# Patient Record
Sex: MALE | Race: WHITE | NOT HISPANIC OR LATINO | Employment: FULL TIME | URBAN - METROPOLITAN AREA
[De-identification: names, ages, dates, MRNs, and addresses within clinical notes are randomized per-mention and may not be internally consistent; named-entity substitution may affect disease eponyms.]

---

## 2017-02-13 ENCOUNTER — HOSPITAL ENCOUNTER (OUTPATIENT)
Dept: GENERAL RADIOLOGY | Facility: HOSPITAL | Age: 47
Discharge: HOME | End: 2017-02-13
Attending: INTERNAL MEDICINE | Admitting: INTERNAL MEDICINE

## 2017-02-13 ENCOUNTER — TRANSCRIBE ORDERS (OUTPATIENT)
Dept: ADMINISTRATIVE | Facility: HOSPITAL | Age: 47
End: 2017-02-13

## 2017-02-13 DIAGNOSIS — M06.4 UNSPECIFIED INFLAMMATORY POLYARTHROPATHY: ICD-10-CM

## 2017-02-13 DIAGNOSIS — Z79.899 POLYPHARMACY: ICD-10-CM

## 2017-02-13 DIAGNOSIS — M06.4 UNSPECIFIED INFLAMMATORY POLYARTHROPATHY: Primary | ICD-10-CM

## 2017-02-13 PROCEDURE — 71020 HC CHEST PA AND LATERAL: CPT

## 2018-07-23 ENCOUNTER — AMBULATORY SURGICAL CENTER (AMBULATORY)
Dept: URBAN - METROPOLITAN AREA SURGERY 17 | Facility: SURGERY | Age: 48
End: 2018-07-23
Payer: COMMERCIAL

## 2018-07-23 ENCOUNTER — OFFICE (AMBULATORY)
Dept: URBAN - METROPOLITAN AREA PATHOLOGY 4 | Facility: PATHOLOGY | Age: 48
End: 2018-07-23
Payer: COMMERCIAL

## 2018-07-23 VITALS
OXYGEN SATURATION: 95 % | DIASTOLIC BLOOD PRESSURE: 66 MMHG | DIASTOLIC BLOOD PRESSURE: 67 MMHG | HEART RATE: 79 BPM | OXYGEN SATURATION: 93 % | SYSTOLIC BLOOD PRESSURE: 93 MMHG | HEART RATE: 85 BPM | HEART RATE: 80 BPM | OXYGEN SATURATION: 97 % | HEART RATE: 83 BPM | DIASTOLIC BLOOD PRESSURE: 42 MMHG | TEMPERATURE: 98.3 F | WEIGHT: 195 LBS | OXYGEN SATURATION: 96 % | SYSTOLIC BLOOD PRESSURE: 103 MMHG | RESPIRATION RATE: 19 BRPM | OXYGEN SATURATION: 99 % | SYSTOLIC BLOOD PRESSURE: 120 MMHG | OXYGEN SATURATION: 92 % | DIASTOLIC BLOOD PRESSURE: 58 MMHG | SYSTOLIC BLOOD PRESSURE: 104 MMHG | DIASTOLIC BLOOD PRESSURE: 57 MMHG | SYSTOLIC BLOOD PRESSURE: 115 MMHG | RESPIRATION RATE: 12 BRPM | SYSTOLIC BLOOD PRESSURE: 96 MMHG | RESPIRATION RATE: 13 BRPM | DIASTOLIC BLOOD PRESSURE: 69 MMHG | TEMPERATURE: 97.5 F | HEART RATE: 75 BPM | HEIGHT: 73 IN | OXYGEN SATURATION: 94 % | HEART RATE: 74 BPM | DIASTOLIC BLOOD PRESSURE: 53 MMHG | RESPIRATION RATE: 16 BRPM | DIASTOLIC BLOOD PRESSURE: 59 MMHG | SYSTOLIC BLOOD PRESSURE: 88 MMHG | HEART RATE: 81 BPM

## 2018-07-23 DIAGNOSIS — Z12.11 ENCOUNTER FOR SCREENING FOR MALIGNANT NEOPLASM OF COLON: ICD-10-CM

## 2018-07-23 DIAGNOSIS — K64.0 FIRST DEGREE HEMORRHOIDS: ICD-10-CM

## 2018-07-23 DIAGNOSIS — K63.5 POLYP OF COLON: ICD-10-CM

## 2018-07-23 DIAGNOSIS — D12.5 BENIGN NEOPLASM OF SIGMOID COLON: ICD-10-CM

## 2018-07-23 PROBLEM — Z80.0 FAMILY HISTORY OF COLON CANCER: Status: ACTIVE | Noted: 2018-07-23

## 2018-07-23 LAB
GI HISTOLOGY: A. UNSPECIFIED: (no result)
GI HISTOLOGY: PDF REPORT: (no result)

## 2018-07-23 PROCEDURE — 45385 COLONOSCOPY W/LESION REMOVAL: CPT | Mod: 33 | Performed by: INTERNAL MEDICINE

## 2018-07-23 PROCEDURE — 88305 TISSUE EXAM BY PATHOLOGIST: CPT | Mod: 33 | Performed by: INTERNAL MEDICINE

## 2018-07-23 RX ADMIN — PROPOFOL 50 MG: 10 INJECTION, EMULSION INTRAVENOUS at 13:40

## 2018-07-23 RX ADMIN — PROPOFOL 100 MG: 10 INJECTION, EMULSION INTRAVENOUS at 13:35

## 2018-07-23 RX ADMIN — PROPOFOL 25 MG: 10 INJECTION, EMULSION INTRAVENOUS at 13:37

## 2018-07-23 RX ADMIN — PROPOFOL 50 MG: 10 INJECTION, EMULSION INTRAVENOUS at 13:48

## 2018-07-23 RX ADMIN — PROPOFOL 50 MG: 10 INJECTION, EMULSION INTRAVENOUS at 13:37

## 2018-07-23 RX ADMIN — PROPOFOL 50 MG: 10 INJECTION, EMULSION INTRAVENOUS at 13:44

## 2018-07-23 RX ADMIN — LIDOCAINE HYDROCHLORIDE 25 MG: 10 INJECTION, SOLUTION EPIDURAL; INFILTRATION; INTRACAUDAL; PERINEURAL at 13:35

## 2018-07-23 NOTE — SERVICEHPINOTES
KELI SORIA  TREVON  48  male  1970   presents for a bypass screening colonoscopy at [facility].

## 2019-05-15 ENCOUNTER — OFFICE VISIT (OUTPATIENT)
Dept: FAMILY MEDICINE CLINIC | Facility: CLINIC | Age: 49
End: 2019-05-15

## 2019-05-15 VITALS
BODY MASS INDEX: 26.54 KG/M2 | OXYGEN SATURATION: 98 % | SYSTOLIC BLOOD PRESSURE: 116 MMHG | HEIGHT: 73 IN | HEART RATE: 78 BPM | TEMPERATURE: 98.6 F | DIASTOLIC BLOOD PRESSURE: 76 MMHG | WEIGHT: 200.3 LBS

## 2019-05-15 DIAGNOSIS — D84.821 IMMUNOSUPPRESSION DUE TO DRUG THERAPY (HCC): ICD-10-CM

## 2019-05-15 DIAGNOSIS — Z80.42 FAMILY HISTORY OF PROSTATE CANCER: ICD-10-CM

## 2019-05-15 DIAGNOSIS — Z00.00 ROUTINE GENERAL MEDICAL EXAMINATION AT A HEALTH CARE FACILITY: Primary | ICD-10-CM

## 2019-05-15 DIAGNOSIS — Z12.5 SCREENING FOR PROSTATE CANCER: ICD-10-CM

## 2019-05-15 DIAGNOSIS — Z79.899 IMMUNOSUPPRESSION DUE TO DRUG THERAPY (HCC): ICD-10-CM

## 2019-05-15 DIAGNOSIS — R53.83 OTHER FATIGUE: ICD-10-CM

## 2019-05-15 DIAGNOSIS — R40.0 DAYTIME SLEEPINESS: ICD-10-CM

## 2019-05-15 DIAGNOSIS — G47.9 DIFFICULTY SLEEPING: ICD-10-CM

## 2019-05-15 PROCEDURE — 99396 PREV VISIT EST AGE 40-64: CPT | Performed by: NURSE PRACTITIONER

## 2019-05-15 RX ORDER — MEDROXYPROGESTERONE ACETATE 150 MG/ML
INJECTION, SUSPENSION INTRAMUSCULAR
COMMUNITY
Start: 2019-02-13 | End: 2022-09-15

## 2019-05-15 NOTE — PROGRESS NOTES
Subjective   Randall Morin is a 48 y.o. male.     Chief Complaint   Patient presents with   • Annual Exam     New to [facility] patient is new to me.  He is here for physical, has not been to see primary care in a couple years.  He struggles with some health problems mainly due to rheumatoid arthritis.  He is , works for [employer], has 2 children who are high school and college aged.    Rheumatoid: He is followed by Dr. Haider, rheumatology.  His RA is mostly in his hands and wrists.  He just recently had a flare and has just come off of steroids for this, is on Enbrel for control.  He reports he is in pain most of the time recently which makes it even more difficult to sleep.    Depression: He is followed by psychiatry and is stable on his current medications.  He did talk to his psychiatrist about his sleep problems.  He wakes up many times throughout the night and never feels like he had a restful sleep.  This is exacerbated with his RA pain.  Psychiatry recommended a sleep study.    Daytime sleepiness: He has excessive sleepiness during the day, he can fall asleep talking to somebody, and is sleepy all day long.  He never feels rested.  He does snore, he is not sure if he has any apneic episodes but does tend to wake up sometimes every hour.    Family history prostate cancer: He comes from a large family with 11 brothers and sisters.  He has 4 brothers with prostate cancer that developed under the age of 50.  2 of his brothers are  from the prostate cancer.  He has had PSAs in the past, but the most recent one is 2 years ago.  He denies any hematuria or difficulty urinating but does notice that sometimes he does not feel like he empties his bladder all the way and has urinary frequency.    He has FH breast cancer, no FH colon cancer.  He had colonoscopy appr 6 months ago which was normal.    He denies tobacco, rarely drinks etoh, denies recreational drug use.     He is unsure of vaccinations and so  "declines them today.  He wants to check with his rheumatologist first.     Social History     Tobacco Use   • Smoking status: Never Smoker   • Smokeless tobacco: Never Used   Substance Use Topics   • Alcohol use: Yes     Comment: rare   • Drug use: No       The following portions of the patient's history were reviewed and updated as appropriate: allergies, current medications, past family history, past medical history, past social history, past surgical history and problem list.    Review of Systems   Constitutional: Positive for activity change (not as active as he normally is due to fatigue) and fatigue. Negative for chills and fever.   HENT: Positive for congestion, ear discharge (intermittently gets crusty ears with drainage-usually after RA flare) and sore throat (appr 3-4 days). Negative for ear pain, postnasal drip, rhinorrhea and trouble swallowing.    Eyes: Positive for itching (due to dry eyes) and visual disturbance (wears corrective lenses). Negative for pain and discharge.   Respiratory: Negative for cough, shortness of breath and wheezing.    Cardiovascular: Negative for chest pain and palpitations.   Gastrointestinal: Negative for abdominal pain, blood in stool, constipation, diarrhea, nausea and vomiting.   Genitourinary: Positive for frequency. Negative for difficulty urinating, dysuria, hematuria, scrotal swelling, testicular pain and urgency.   Musculoskeletal: Positive for arthralgias, joint swelling and myalgias. Negative for gait problem.   Skin: Negative for rash.   Allergic/Immunologic: Negative for environmental allergies.   Neurological: Positive for headaches (HA yesterday, resolved today). Negative for dizziness and weakness.   Hematological: Positive for adenopathy (anterior cervical).   Psychiatric/Behavioral: Positive for decreased concentration (memory loss due to sleepiness).       Objective   Blood pressure 116/76, pulse 78, temperature 98.6 °F (37 °C), height 185.4 cm (73\"), weight " 90.9 kg (200 lb 4.8 oz), SpO2 98 %.    Physical Exam   Constitutional: He is oriented to person, place, and time. He appears well-developed and well-nourished. No distress.   HENT:   Head: Normocephalic and atraumatic.   Right Ear: Tympanic membrane, external ear and ear canal normal.   Left Ear: Tympanic membrane, external ear and ear canal normal.   Mouth/Throat: Uvula is midline and oropharynx is clear and moist.   Eyes: EOM are normal. Pupils are equal, round, and reactive to light. Right eye exhibits no discharge. Left eye exhibits no discharge. Right conjunctiva is injected. Left conjunctiva is injected.   Neck: Neck supple. No thyromegaly present.   Cardiovascular: Normal rate, regular rhythm, normal heart sounds and intact distal pulses.   No murmur heard.  Pulmonary/Chest: Effort normal and breath sounds normal.   Abdominal: Soft. Bowel sounds are normal. There is no hepatosplenomegaly. There is no tenderness.   Musculoskeletal: He exhibits deformity.   Gait smooth and steady  B/l hands with enlarged finger joints-no erythema   Lymphadenopathy:     He has cervical adenopathy (left mildly TTP).   Neurological: He is alert and oriented to person, place, and time. No cranial nerve deficit.   Skin: Skin is warm and dry.   Psychiatric: He has a normal mood and affect.   Nursing note and vitals reviewed.      Assessment   Problem List Items Addressed This Visit     None      Visit Diagnoses     Routine general medical examination at a health care facility    -  Primary    Relevant Orders    Comprehensive metabolic panel    Lipid Panel With LDL/HDL Ratio    CBC and Differential    TSH Rfx On Abnormal To Free T4    Family history of prostate cancer        Relevant Orders    PSA Screen    Ambulatory Referral to Urology    Daytime sleepiness        Immunosuppression due to drug therapy        Screening for prostate cancer        Relevant Orders    PSA Screen    Ambulatory Referral to Urology    Other fatigue         Relevant Orders    CBC and Differential    TSH Rfx On Abnormal To Free T4    Ambulatory Referral to Sleep Medicine    Difficulty sleeping        Relevant Orders    TSH Rfx On Abnormal To Free T4    Ambulatory Referral to Sleep Medicine           Procedures           Impression and Plan: We will get some basic blood work since it was last done in 2016.  I will get a screening PSA, but I think it is wise to refer him to urology for follow up due to the strong family history of prostate cancer.  I will refer to sleep medicine for possible sleep apnea evaluation.  His rheumatoid arthritis had been well controlled on Enbrel, however he may need to have an evaluation for a different medication due to recent flare.  He will continue to follow with rheumatology.  Depression: Seems to be pretty well controlled on current medications, continue these and continue follow-up with psychiatry.    He is up-to-date on colonoscopy, however I do not have access to that report and will request it.  He declines any vaccines today.    Most likely has seasonal allergies vs viral URI.  He has supportive meds at home and declines Rx.  We did discuss importance of making sure he that if he worsens he let me know due to immunosuppression.     We discussed healthy diet and ways to incorporate activity and exercise into daily life.  Recommendations include trying to get at least 150 mins. of moderate intensity aerobic activity that provide enjoyment to lower risks of CVD disease.     Health Maintenance Due   Topic Date Due   • TDAP/TD VACCINES (1 - Tdap) 07/07/1987   • COLONOSCOPY  02/29/2016   • ZOSTER VACCINE (1 of 2) 07/07/2018

## 2019-05-16 LAB
ALBUMIN SERPL-MCNC: 4 G/DL (ref 3.5–5.2)
ALBUMIN/GLOB SERPL: 1.2 G/DL
ALP SERPL-CCNC: 49 U/L (ref 39–117)
ALT SERPL-CCNC: 28 U/L (ref 1–41)
AST SERPL-CCNC: 36 U/L (ref 1–40)
BASOPHILS # BLD AUTO: 0.05 10*3/MM3 (ref 0–0.2)
BASOPHILS NFR BLD AUTO: 0.6 % (ref 0–1.5)
BILIRUB SERPL-MCNC: 0.3 MG/DL (ref 0.2–1.2)
BUN SERPL-MCNC: 16 MG/DL (ref 6–20)
BUN/CREAT SERPL: 15.7 (ref 7–25)
CALCIUM SERPL-MCNC: 10.1 MG/DL (ref 8.6–10.5)
CHLORIDE SERPL-SCNC: 102 MMOL/L (ref 98–107)
CHOLEST SERPL-MCNC: 164 MG/DL (ref 0–200)
CO2 SERPL-SCNC: 29.8 MMOL/L (ref 22–29)
CREAT SERPL-MCNC: 1.02 MG/DL (ref 0.76–1.27)
EOSINOPHIL # BLD AUTO: 0.14 10*3/MM3 (ref 0–0.4)
EOSINOPHIL NFR BLD AUTO: 1.8 % (ref 0.3–6.2)
ERYTHROCYTE [DISTWIDTH] IN BLOOD BY AUTOMATED COUNT: 13.6 % (ref 12.3–15.4)
GLOBULIN SER CALC-MCNC: 3.4 GM/DL
GLUCOSE SERPL-MCNC: 74 MG/DL (ref 65–99)
HCT VFR BLD AUTO: 45 % (ref 37.5–51)
HDLC SERPL-MCNC: 38 MG/DL (ref 40–60)
HGB BLD-MCNC: 14.3 G/DL (ref 13–17.7)
IMM GRANULOCYTES # BLD AUTO: 0.03 10*3/MM3 (ref 0–0.05)
IMM GRANULOCYTES NFR BLD AUTO: 0.4 % (ref 0–0.5)
LDLC SERPL CALC-MCNC: 82 MG/DL (ref 0–100)
LDLC/HDLC SERPL: 2.16 {RATIO}
LYMPHOCYTES # BLD AUTO: 2.83 10*3/MM3 (ref 0.7–3.1)
LYMPHOCYTES NFR BLD AUTO: 35.9 % (ref 19.6–45.3)
MCH RBC QN AUTO: 30.5 PG (ref 26.6–33)
MCHC RBC AUTO-ENTMCNC: 31.8 G/DL (ref 31.5–35.7)
MCV RBC AUTO: 95.9 FL (ref 79–97)
MONOCYTES # BLD AUTO: 0.99 10*3/MM3 (ref 0.1–0.9)
MONOCYTES NFR BLD AUTO: 12.5 % (ref 5–12)
NEUTROPHILS # BLD AUTO: 3.85 10*3/MM3 (ref 1.7–7)
NEUTROPHILS NFR BLD AUTO: 48.8 % (ref 42.7–76)
NRBC BLD AUTO-RTO: 0 /100 WBC (ref 0–0.2)
PLATELET # BLD AUTO: 251 10*3/MM3 (ref 140–450)
POTASSIUM SERPL-SCNC: 4.9 MMOL/L (ref 3.5–5.2)
PROT SERPL-MCNC: 7.4 G/DL (ref 6–8.5)
PSA SERPL-MCNC: 0.71 NG/ML (ref 0–4)
RBC # BLD AUTO: 4.69 10*6/MM3 (ref 4.14–5.8)
SODIUM SERPL-SCNC: 142 MMOL/L (ref 136–145)
TRIGL SERPL-MCNC: 219 MG/DL (ref 0–150)
TSH SERPL DL<=0.005 MIU/L-ACNC: 1.38 MIU/ML (ref 0.27–4.2)
VLDLC SERPL CALC-MCNC: 43.8 MG/DL
WBC # BLD AUTO: 7.89 10*3/MM3 (ref 3.4–10.8)

## 2019-05-16 NOTE — PROGRESS NOTES
Spoke in detail with Patient about results, patient expressed understanding and will follow up as agreed.     Any pending Labs and/or Diagnostic procedures required have been ordered for future release.    Heidy HERNANDEZ

## 2019-05-30 ENCOUNTER — APPOINTMENT (OUTPATIENT)
Dept: SLEEP MEDICINE | Facility: HOSPITAL | Age: 49
End: 2019-05-30

## 2022-09-15 ENCOUNTER — OFFICE VISIT (OUTPATIENT)
Dept: FAMILY MEDICINE CLINIC | Facility: CLINIC | Age: 52
End: 2022-09-15

## 2022-09-15 VITALS
HEIGHT: 73 IN | HEART RATE: 87 BPM | SYSTOLIC BLOOD PRESSURE: 97 MMHG | OXYGEN SATURATION: 96 % | WEIGHT: 192.8 LBS | BODY MASS INDEX: 25.55 KG/M2 | DIASTOLIC BLOOD PRESSURE: 62 MMHG | TEMPERATURE: 96.8 F

## 2022-09-15 DIAGNOSIS — Z00.00 ROUTINE GENERAL MEDICAL EXAMINATION AT A HEALTH CARE FACILITY: Primary | ICD-10-CM

## 2022-09-15 PROCEDURE — 99386 PREV VISIT NEW AGE 40-64: CPT | Performed by: NURSE PRACTITIONER

## 2022-09-15 NOTE — PROGRESS NOTES
"Chief Complaint  Establish Care (Re est, CPE) and Annual Exam    Subjective        Randall Morin presents to [facility]  History of Present Illness    The patient presents today for a physical.    The patient reports that it has been 3 years since he has seen a doctor and his wife told him he should go.    The patient states that he has no complaints. He states that he and his family have avoided getting COVID-19. He states he works from home most of the time.    The patient reports that he saw a podiatrist for plantar fasciitis. He states he had steroid injections twice and it is now gone.     The patient states that he is being followed by Dr. Beard, a urologist, and his \"numbers have been super flat.\" He states he last saw Dr. Beard about 3 months ago.    The patient reports that his rheumatoid arthritis has improved. He adds that the specialist was not certain it was RA, but decided to treat it as if it were to see if his symptoms improved. He states that he received 1 injection, but did not return for another due to COVID-19. The patient states \"I eliot feel better than I ever have.\"     The patient mentions that he has bumps under his skin and wonders what they are.    The patient states that his health goals are to stay in shape and continue to work out.    The patient reports that he is doing well on Celexa 20 mg daily. He expresses going through life changes such as his children finishing high school and college. He states that his insomnia is intermittent; he does not take medication for sleep.    The patient states that his diet is good. He reports that his colonoscopy was clear and he was advised to have another in 10 years.    The patient denies shortness of breath, cough, or chest pain. He admits that his heart \"flutters\" occasionally. He recalls noticing it more with anxiety. The patient denies melena, heartburn, or ear problems. He states that he always has cerumen " "present. The patient denies trouble swallowing. He reports that he sees his dentist every 6 months.    The patient asks if he needs to get a shingles vaccine after having shingles in 2017. The patient states his last tetanus vaccination was about 15 years ago. He declines getting one today.    Review of Systems has been reviewed and is negative other than what has been discussed within the HPI.    Objective   Vital Signs:  BP 97/62   Pulse 87   Temp 96.8 °F (36 °C)   Ht 185.4 cm (73\")   Wt 87.5 kg (192 lb 12.8 oz)   SpO2 96%   BMI 25.44 kg/m²   Estimated body mass index is 25.44 kg/m² as calculated from the following:    Height as of this encounter: 185.4 cm (73\").    Weight as of this encounter: 87.5 kg (192 lb 12.8 oz).    BMI is >= 25 and <30. (Overweight) The following options were offered after discussion;: weight loss educational material (shared in after visit summary)      Physical Exam  Vitals and nursing note reviewed.   Constitutional:       General: He is not in acute distress.     Appearance: He is well-developed. He is not ill-appearing.      Comments: Fit appearing, appears younger than stated age   HENT:      Head: Normocephalic and atraumatic.      Right Ear: Tympanic membrane, ear canal and external ear normal.      Left Ear: Tympanic membrane, ear canal and external ear normal.      Ears:      Comments: Scant dried cerumen, L>R, no impaction     Mouth/Throat:      Mouth: Mucous membranes are moist.      Pharynx: Uvula midline. No posterior oropharyngeal erythema.   Eyes:      General: No scleral icterus.        Right eye: No discharge.         Left eye: No discharge.      Conjunctiva/sclera: Conjunctivae normal.      Pupils: Pupils are equal, round, and reactive to light.   Neck:      Thyroid: No thyromegaly.   Cardiovascular:      Rate and Rhythm: Normal rate and regular rhythm.      Heart sounds: Normal heart sounds.   Pulmonary:      Effort: Pulmonary effort is normal.      Breath sounds: " Normal breath sounds.   Abdominal:      General: Bowel sounds are normal.      Palpations: Abdomen is soft.      Tenderness: There is no abdominal tenderness.   Musculoskeletal:         General: No deformity.      Cervical back: Neck supple.      Comments: Gait smooth and steady   Lymphadenopathy:      Cervical: No cervical adenopathy.   Skin:     General: Skin is warm and dry.      Comments: Right arm with soft, mobile cystic mass, no erythema, NTTP   Neurological:      General: No focal deficit present.      Mental Status: He is alert and oriented to person, place, and time.   Psychiatric:         Attention and Perception: Attention and perception normal.         Mood and Affect: Mood and affect normal.         Speech: Speech normal.         Behavior: Behavior normal. Behavior is cooperative.         Thought Content: Thought content normal.      Comments: Very pleasant, conversant        Result Review :             No results were reviewed or obtained today.     Assessment and Plan   Diagnoses and all orders for this visit:    1. Routine general medical examination at a health care facility (Primary)  -     CBC & Differential  -     Comprehensive Metabolic Panel  -     Lipid Panel With LDL / HDL Ratio  -     TSH Rfx On Abnormal To Free T4    Health maintenance    Appropriate health maintenance and prevention topics specific for this patient were discussed today.  Additionally, health goals, and health concerns addressed as appropriate.  Pt was encouraged to stay up to date on recommended screenings and vaccines based on USPSTF guidelines.     The patient was offered a Tdap today, but he declined. He was advised to get one should he have an injury to his skin.     The bumps noted under his skin were freely mobile and fluctuant. They appear to be small fatty tumors. Discussed s/s that require f/u eval.    He will let us know where his colonoscopy was performed so that we can get a copy into our system.   He was  advised that he can still get herpes zoster even though he had it before.    Anxiety  The patient was offered a higher dose of citalopram. He states that he will consider doing so.           Follow Up   Return in about 1 year (around 9/15/2023) for Annual physical.  Patient was given instructions and counseling regarding his condition or for health maintenance advice. Please see specific information pulled into the AVS if appropriate.     Transcribed from ambient dictation for ANTWON Díaz by Sharmin Solomon.  09/15/22   13:25 EDT    Patient verbalized consent to the visit recording.  I have personally performed the services described in this document as transcribed by the above individual, and it is both accurate and complete.  ANTWON Díaz  9/20/2022  08:10 EDT

## 2022-09-16 LAB
ALBUMIN SERPL-MCNC: 4.2 G/DL (ref 3.5–5.2)
ALBUMIN/GLOB SERPL: 1.6 G/DL
ALP SERPL-CCNC: 59 U/L (ref 39–117)
ALT SERPL-CCNC: 15 U/L (ref 1–41)
AST SERPL-CCNC: 21 U/L (ref 1–40)
BASOPHILS # BLD AUTO: 0.04 10*3/MM3 (ref 0–0.2)
BASOPHILS NFR BLD AUTO: 0.6 % (ref 0–1.5)
BILIRUB SERPL-MCNC: 0.4 MG/DL (ref 0–1.2)
BUN SERPL-MCNC: 12 MG/DL (ref 6–20)
BUN/CREAT SERPL: 10.7 (ref 7–25)
CALCIUM SERPL-MCNC: 9.9 MG/DL (ref 8.6–10.5)
CHLORIDE SERPL-SCNC: 105 MMOL/L (ref 98–107)
CHOLEST SERPL-MCNC: 170 MG/DL (ref 0–200)
CO2 SERPL-SCNC: 28.9 MMOL/L (ref 22–29)
CREAT SERPL-MCNC: 1.12 MG/DL (ref 0.76–1.27)
EGFRCR-CYS SERPLBLD CKD-EPI 2021: 78.1 ML/MIN/1.73
EOSINOPHIL # BLD AUTO: 0.07 10*3/MM3 (ref 0–0.4)
EOSINOPHIL NFR BLD AUTO: 1.1 % (ref 0.3–6.2)
ERYTHROCYTE [DISTWIDTH] IN BLOOD BY AUTOMATED COUNT: 12.8 % (ref 12.3–15.4)
GLOBULIN SER CALC-MCNC: 2.6 GM/DL
GLUCOSE SERPL-MCNC: 79 MG/DL (ref 65–99)
HCT VFR BLD AUTO: 42.1 % (ref 37.5–51)
HDLC SERPL-MCNC: 42 MG/DL (ref 40–60)
HGB BLD-MCNC: 13.9 G/DL (ref 13–17.7)
IMM GRANULOCYTES # BLD AUTO: 0.02 10*3/MM3 (ref 0–0.05)
IMM GRANULOCYTES NFR BLD AUTO: 0.3 % (ref 0–0.5)
LDLC SERPL CALC-MCNC: 109 MG/DL (ref 0–100)
LDLC/HDLC SERPL: 2.55 {RATIO}
LYMPHOCYTES # BLD AUTO: 2.47 10*3/MM3 (ref 0.7–3.1)
LYMPHOCYTES NFR BLD AUTO: 38.4 % (ref 19.6–45.3)
MCH RBC QN AUTO: 30.7 PG (ref 26.6–33)
MCHC RBC AUTO-ENTMCNC: 33 G/DL (ref 31.5–35.7)
MCV RBC AUTO: 92.9 FL (ref 79–97)
MONOCYTES # BLD AUTO: 0.79 10*3/MM3 (ref 0.1–0.9)
MONOCYTES NFR BLD AUTO: 12.3 % (ref 5–12)
NEUTROPHILS # BLD AUTO: 3.04 10*3/MM3 (ref 1.7–7)
NEUTROPHILS NFR BLD AUTO: 47.3 % (ref 42.7–76)
NRBC BLD AUTO-RTO: 0.2 /100 WBC (ref 0–0.2)
PLATELET # BLD AUTO: 254 10*3/MM3 (ref 140–450)
POTASSIUM SERPL-SCNC: 4.6 MMOL/L (ref 3.5–5.2)
PROT SERPL-MCNC: 6.8 G/DL (ref 6–8.5)
RBC # BLD AUTO: 4.53 10*6/MM3 (ref 4.14–5.8)
SODIUM SERPL-SCNC: 140 MMOL/L (ref 136–145)
TRIGL SERPL-MCNC: 104 MG/DL (ref 0–150)
TSH SERPL DL<=0.005 MIU/L-ACNC: 1.31 UIU/ML (ref 0.27–4.2)
VLDLC SERPL CALC-MCNC: 19 MG/DL (ref 5–40)
WBC # BLD AUTO: 6.43 10*3/MM3 (ref 3.4–10.8)

## 2023-03-07 ENCOUNTER — OFFICE VISIT (OUTPATIENT)
Dept: FAMILY MEDICINE CLINIC | Facility: CLINIC | Age: 53
End: 2023-03-07
Payer: COMMERCIAL

## 2023-03-07 VITALS
SYSTOLIC BLOOD PRESSURE: 113 MMHG | TEMPERATURE: 97.1 F | DIASTOLIC BLOOD PRESSURE: 69 MMHG | BODY MASS INDEX: 25.45 KG/M2 | HEIGHT: 73 IN | WEIGHT: 192 LBS | HEART RATE: 82 BPM | OXYGEN SATURATION: 96 %

## 2023-03-07 DIAGNOSIS — B02.9 HERPES ZOSTER WITHOUT COMPLICATION: Primary | ICD-10-CM

## 2023-03-07 PROCEDURE — 99213 OFFICE O/P EST LOW 20 MIN: CPT | Performed by: NURSE PRACTITIONER

## 2023-03-07 RX ORDER — VALACYCLOVIR HYDROCHLORIDE 1 G/1
1000 TABLET, FILM COATED ORAL 3 TIMES DAILY
Qty: 21 TABLET | Refills: 0 | Status: SHIPPED | OUTPATIENT
Start: 2023-03-07 | End: 2023-03-14

## 2023-03-07 NOTE — PROGRESS NOTES
"Chief Complaint  Rash (C/o rash R side back/abdomin )    Subjective        Randall Morin presents to [facility]  History of Present Illness    Mr. Randall Morin is a 52-year-old male who presents today for evaluation of a rash.    The rash began right flank on 03/02/2023 and is now spreading across flank to abdomen.  He feels miserable. He sleeps on his side and notes that the rash itches and burns. The new lesions have been present for the last 2 days. He has had shingles in the past; however, he did not experience any pain associated with shingles. No fever, chills.  He is upset he did not get shingles booster as we discussed last visit.     He has never had COVID-19. He has not had an upper respiratory infection in approximately 5 years.     The following portions of the patient's history were reviewed and updated as appropriate: allergies, current medications, past family history, past medical history, past social history, past surgical history and problem list.      Objective   Vital Signs:  /69   Pulse 82   Temp 97.1 °F (36.2 °C)   Ht 185.4 cm (73\")   Wt 87.1 kg (192 lb)   SpO2 96%   BMI 25.33 kg/m²   Estimated body mass index is 25.33 kg/m² as calculated from the following:    Height as of this encounter: 185.4 cm (73\").    Weight as of this encounter: 87.1 kg (192 lb).       BMI is >= 25 and <30. (Overweight) The following options were offered after discussion;: weight loss educational material (shared in after visit summary)      Physical Exam  Vitals and nursing note reviewed.   Constitutional:       General: He is not in acute distress.     Appearance: He is well-developed. He is not diaphoretic.   HENT:      Head: Normocephalic and atraumatic.   Eyes:      General:         Right eye: No discharge.         Left eye: No discharge.      Conjunctiva/sclera: Conjunctivae normal.   Pulmonary:      Effort: Pulmonary effort is normal.   Musculoskeletal:         General: No " deformity.      Comments: Gait smooth and steady   Skin:     General: Skin is warm and dry.      Comments: Vesicular rash on red base linear pattern right flank and extending to right lower abdomen   Neurological:      Mental Status: He is alert and oriented to person, place, and time.   Psychiatric:         Mood and Affect: Mood normal.         Behavior: Behavior normal.        Result Review :                   Assessment and Plan   Diagnoses and all orders for this visit:    1. Herpes zoster without complication (Primary)  -     valACYclovir (Valtrex) 1000 MG tablet; Take 1 tablet by mouth 3 (Three) Times a Day for 7 days.  Dispense: 21 tablet; Refill: 0    Herpes zoster  The patient will start Valtrex 3 times daily for 7 days. He may also use over-the-counter lidocaine patches but not on open skin. . If his symptoms do not improve, he will contact the office.       Follow Up   No follow-ups on file.  Patient was given instructions and counseling regarding his condition or for health maintenance advice. Please see specific information pulled into the AVS if appropriate.       Answers for HPI/ROS submitted by the patient on 3/6/2023  What is the primary reason for your visit?: Rash    Transcribed from ambient dictation for ANTWON Díaz by Loni Landry.  03/07/23   17:20 EST    Patient or patient representative verbalized consent to the visit recording.  I have personally performed the services described in this document as transcribed by the above individual, and it is both accurate and complete.

## 2023-03-13 ENCOUNTER — OFFICE VISIT (OUTPATIENT)
Dept: SPORTS MEDICINE | Facility: CLINIC | Age: 53
End: 2023-03-13
Payer: COMMERCIAL

## 2023-03-13 VITALS
TEMPERATURE: 98.4 F | BODY MASS INDEX: 25.26 KG/M2 | DIASTOLIC BLOOD PRESSURE: 74 MMHG | HEART RATE: 85 BPM | WEIGHT: 190.6 LBS | HEIGHT: 73 IN | SYSTOLIC BLOOD PRESSURE: 126 MMHG | OXYGEN SATURATION: 98 %

## 2023-03-13 DIAGNOSIS — M54.16 PAIN, RADICULAR, LUMBAR: Primary | ICD-10-CM

## 2023-03-13 PROCEDURE — 99213 OFFICE O/P EST LOW 20 MIN: CPT | Performed by: FAMILY MEDICINE

## 2023-03-13 RX ORDER — PREDNISONE 20 MG/1
TABLET ORAL
Qty: 12 TABLET | Refills: 0 | Status: SHIPPED | OUTPATIENT
Start: 2023-03-13

## 2023-03-13 RX ORDER — TIZANIDINE 4 MG/1
TABLET ORAL
Qty: 30 TABLET | Refills: 0 | Status: SHIPPED | OUTPATIENT
Start: 2023-03-13

## 2023-03-13 NOTE — PROGRESS NOTES
"Chief Complaint  Back Pain (LOW BACK- severe pain started about a month ago. Shoots down legs, numbness )    Subjective        Randall Morin presents to [facility]  History of Present Illness  Patient began to note some lumbar spine discomfort about a month ago after working out, over the past 2 weeks he has seen increase in pain.  Pain is worse with transitioning from seated to standing.  Pain does seem to wax and wane, some mornings I can better get out of bed and other mornings and fine\".  Patient also has been having intermittent episodes of radiation of pain and numbness down posterior legs, \"it can be the right or the left but never at the same time\".  Alarm symptoms.  No previous back injuries.         Objective   Vital Signs:  /74 (BP Location: Left arm, Patient Position: Sitting, Cuff Size: Adult)   Pulse 85   Temp 98.4 °F (36.9 °C) (Temporal)   Ht 185.4 cm (72.99\")   Wt 86.5 kg (190 lb 9.6 oz)   SpO2 98%   BMI 25.15 kg/m²   Estimated body mass index is 25.15 kg/m² as calculated from the following:    Height as of this encounter: 185.4 cm (72.99\").    Weight as of this encounter: 86.5 kg (190 lb 9.6 oz).             Physical Exam  Vitals reviewed.   Constitutional:       Appearance: He is well-developed.   HENT:      Head: Normocephalic and atraumatic.   Eyes:      Conjunctiva/sclera: Conjunctivae normal.      Pupils: Pupils are equal, round, and reactive to light.   Cardiovascular:      Comments: No peripheral edema  Pulmonary:      Effort: Pulmonary effort is normal.   Musculoskeletal:      Comments: Patient does have tightness and tenderness of the paravertebral bundles in the lumbar spine from L2-L4.  Pain however is worse with left lateral bending and not as bad with right lateral bending.  Patient has some pain with extension but does not cause radiation of pain down the legs.  Patient begins to get pain past about 30 degrees of forward flexion.  Patient has " bilateral straight leg raises although left is greater than right and a positive slump test bilaterally.  DTRs are 1+ symmetric motor 5 out of 5.   Skin:     General: Skin is warm and dry.   Neurological:      Mental Status: He is alert and oriented to person, place, and time.   Psychiatric:         Behavior: Behavior normal.        Result Review :        Lumbar Spine X-Ray  Indication: Pain  Views: AP and Lateral    Findings:  There is lumbar levoscoliosis with apex around L2, there is a corresponding dextroscoliosis in the lower thoracic spine.  There are degenerative endplate changes from L1-L5.  Narrowed disc spaces from L3-L5.  There also appears to be a slight retrolisthesis of L5 on S1.    No prior studies were available for comparison.             Assessment and Plan   Diagnoses and all orders for this visit:    1. Pain, radicular, lumbar (Primary)  -     XR Spine Lumbar 2 or 3 View  -     Cancel: Ambulatory Referral to Physical Therapy  -     predniSONE (DELTASONE) 20 MG tablet; 3 qd x 2 d, then 2 qd x 2 d, then 1 qd x 2 days  Dispense: 12 tablet; Refill: 0  -     tiZANidine (ZANAFLEX) 4 MG tablet; 1/2 to 1 q 8 h prn spasm  Dispense: 30 tablet; Refill: 0  -     Ambulatory Referral to Physical Therapy      If no significant improvement with PT then will need to check MRI lumbar spine.       Follow Up   No follow-ups on file.  Patient was given instructions and counseling regarding his condition or for health maintenance advice. Please see specific information pulled into the AVS if appropriate.

## 2023-03-15 ENCOUNTER — PATIENT ROUNDING (BHMG ONLY) (OUTPATIENT)
Dept: SPORTS MEDICINE | Facility: CLINIC | Age: 53
End: 2023-03-15
Payer: COMMERCIAL

## 2023-03-15 NOTE — PROGRESS NOTES
March 15, 2023    A Brille[employer] Message has been sent to the patient for PATIENT ROUNDING with [facility]

## 2023-03-28 ENCOUNTER — TREATMENT (OUTPATIENT)
Dept: PHYSICAL THERAPY | Facility: CLINIC | Age: 53
End: 2023-03-28
Payer: COMMERCIAL

## 2023-03-28 DIAGNOSIS — M53.86 DECREASED ROM OF LUMBAR SPINE: ICD-10-CM

## 2023-03-28 DIAGNOSIS — R29.898 IMPAIRED FLEXIBILITY OF LOWER EXTREMITY: ICD-10-CM

## 2023-03-28 DIAGNOSIS — M54.42 ACUTE BILATERAL LOW BACK PAIN WITH BILATERAL SCIATICA: Primary | ICD-10-CM

## 2023-03-28 DIAGNOSIS — M54.41 ACUTE BILATERAL LOW BACK PAIN WITH BILATERAL SCIATICA: Primary | ICD-10-CM

## 2023-03-28 PROCEDURE — 97162 PT EVAL MOD COMPLEX 30 MIN: CPT | Performed by: PHYSICAL THERAPIST

## 2023-03-28 PROCEDURE — 97012 MECHANICAL TRACTION THERAPY: CPT | Performed by: PHYSICAL THERAPIST

## 2023-03-28 PROCEDURE — 97110 THERAPEUTIC EXERCISES: CPT | Performed by: PHYSICAL THERAPIST

## 2023-03-28 NOTE — PROGRESS NOTES
"  Physical Therapy Initial Evaluation and Plan of Care    [address] [facility] [address]    Patient: Randall Morin   : 1970  Diagnosis/ICD-10 Code:  Acute bilateral low back pain with bilateral sciatica [M54.42, M54.41]  Referring practitioner: Alphonso Villa,*  Date of Initial Visit: 3/28/2023  Today's Date: 3/28/2023  Patient seen for 1 sessions           Subjective Questionnaire: Back Index: 20/50 or 40%      Subjective Evaluation    History of Present Illness  Mechanism of injury: Patient began to note some lumbar spine discomfort about a month ago after working out, over the past month (beginning of March) he has seen increase in pain.  Pain is worse with transitioning from seated to standing.  Pain does seem to wax and wane, some mornings I can better get out of bed and other mornings are fine.  Patient also has been having intermittent episodes of radiation of pain and numbness down posterior legs, \"it can be the right or the left but never at the same time\".  No previous back injuries.  Pt was given a steroid pack ~ 2 weeks ago; but did not take it. Still working out; [employer] at work and daily workouts. I can't sit for long. I can't lift anything overhead.     Hobbies: bowling, golf, fly fishing    X ray  Lumbar dextroscoliosis is noted. Mild retrolisthesis of L2 on L3, L3 on  L4, L5 on S1. Vertebral body heights appear preserved. No acute fracture is identified. Multilevel endplate spurring, disc space narrowing, facet arthropathy are present. If there is further clinical concern, MRI could be considered for further evaluation.    PMH: broken neck per pt report >20 years ago      Patient Occupation: [employer] work Quality of life: excellent    Pain  Current pain ratin  At best pain ratin  At worst pain ratin  Quality: discomfort, sharp, tight and dull ache  Relieving factors: change in position  Aggravating factors: lifting, movement, overhead activity, standing, " repetitive movement, squatting, prolonged positioning, sleeping and ambulation  Progression: improved    Patient Goals  Patient goals for therapy: decreased pain, improved balance, increased motion, increased strength, independence with ADLs/IADLs, return to sport/leisure activities and return to work             Objective          Postural Observations  Correction of posture: makes symptoms worse    Additional Postural Observation Details  Sitting and standing makes it worse; really any prolonged positioning    Pt reports sitting up straight increases back pain    Palpation   Left   Hypertonic in the erector spinae, lumbar paraspinals and quadratus lumborum.   Tenderness of the erector spinae, lumbar paraspinals and quadratus lumborum.     Right   Hypertonic in the erector spinae, lumbar paraspinals and quadratus lumborum. Tenderness of the erector spinae, lumbar paraspinals and quadratus lumborum.     Tenderness     Lumbar Spine  Tenderness in the spinous process and facet joint.     Left Hip   No tenderness in the ASIS and PSIS.     Right Hip   No tenderness in the ASIS and PSIS.     Additional Tenderness Details  Moderate tenderness at L3-L5 spinous process and facet joints     Neurological Testing     Sensation     Lumbar   Left   Intact: light touch    Right   Intact: light touch    Reflexes   Left   Patellar (L4): trace (1+)  Achilles (S1): absent (0)    Right   Patellar (L4): normal (2+)  Achilles (S1): absent (0)    Active Range of Motion     Lumbar   Flexion: Active lumbar flexion: limited due to inflexibility, but no back pain.   Extension: WFL  Left lateral flexion: Active left lumbar lateral flexion: limited ~25% of normal.   Right lateral flexion: WFL  Left rotation: WFL  Right rotation: WFL    Strength/Myotome Testing     Lumbar   Left   Heel walk: normal  Toe walk: normal    Right   Heel walk: normal  Toe walk: normal    Left Hip   Planes of Motion   Flexion: 4  Extension: 4-  Abduction:  4+  Adduction: 4+    Right Hip   Planes of Motion   Flexion: 4  Extension: 4-  Abduction: 4+  Adduction: 4+    Left Knee   Flexion: 5  Extension: 5    Right Knee   Flexion: 5  Extension: 5    Left Ankle/Foot   Plantar flexion: 5    Right Ankle/Foot   Plantar flexion: 5    Tests       Thoracic   Negative slump.     Lumbar   Positive repeated flexion.     Left   Negative passive SLR.     Right   Positive passive SLR.     Left Pelvic Girdle/Sacrum   Negative: sacrum compression.     Right Pelvic Girdle/Sacrum   Negative: sacrum compression.     Additional Tests Details  ASIS and PSIS WNL    Lumbar Flexibility Comments:   HS stretch/SLR on RLE: 37 degrees  HS stretch/SLR on LLE: 42 degrees          Assessment & Plan     Assessment  Impairments: abnormal or restricted ROM, activity intolerance, impaired balance, impaired physical strength, lacks appropriate home exercise program and pain with function  Functional Limitations: carrying objects, lifting, sleeping, walking, pulling, pushing, uncomfortable because of pain, moving in bed, sitting, standing, stooping and unable to perform repetitive tasks  Assessment details: Randall Morin is a 52 y.o. year-old male referred to physical therapy for B low back pain with sciatic like symptoms; pt reports radiating pain into each LE. He presents with a evolving clinical presentation.  He has no comorbidities and personal factors of an active lifestyle (golfing, fishing, bowling) that may affect his progress in the plan of care.  Signs and symptoms are consistent with physical therapy diagnosis of neural tension BLE and limited lumbar ROM with noted hip/core weakness. Pt with impaired LE flexibility. Tenderness to palpation at L3-L5. Patient is appropriate for skilled physical therapy in order to reduce pain and increase ease with daily mobility. During evaluation, pt educated on anatomy, goal of interventions, initial evaluation, workout modifications/activities to avoid and body  mechanics to promote healthy lifestyle and improve quality of life.     Prognosis: good    Goals  Plan Goals: ST weeks  1. Pt will be able to perform/tolerate initial HEP without increased back pain  2. Increased lumbar AROM to WNL and pain free in all planes to allow for increased ease with sit-stand transfers and functional activities  3. Pt will improve Back Index score to 15/50 to improve subjective function during ADLs  4.Decrease lumbar PS tightness and tenderness to minimal with palpation to be able to sleep comfortably through the night     LT weeks  1. Pt will be able to perform/tolerate HEP independently  2. Pt will be able to sit for up to 30 min to be able to make it through his meetings at work  3. Pt will improve Back Index to 10/50 to improve subjective function during ADLs  4. Pt will improve (B) LE hip flexion and lower abdominal strength to 5/5 to allow for pushing, pulling and activities to occur without pain (driving, sitting, household & job requirements)  5. Pt will improve HS flexibility with SLR to at least 60 degrees to be able to decrease neural tension down BLE  6. Pt will be able to lift weights and complete recreational ax without exacerbation back pain    Plan  Therapy options: will be seen for skilled therapy services  Planned modality interventions: cryotherapy, electrical stimulation/Russian stimulation, TENS, traction, ultrasound, thermotherapy (hydrocollator packs) and dry needling  Planned therapy interventions: abdominal trunk stabilization, balance/weight-bearing training, body mechanics training, flexibility, functional ROM exercises, gait training, home exercise program, joint mobilization, manual therapy, postural training, spinal/joint mobilization, strengthening, stretching, therapeutic activities, motor coordination training, soft tissue mobilization, ADL retraining, neuromuscular re-education, transfer training and IADL retraining  Treatment plan discussed with:  patient  Plan details: 2x per week for 12 weeks        Visit Diagnoses:    ICD-10-CM ICD-9-CM   1. Acute bilateral low back pain with bilateral sciatica  M54.42 724.2    M54.41 724.3   2. Decreased ROM of lumbar spine  M53.86 724.9   3. Impaired flexibility of lower extremity  R29.898 781.99       Timed:  Manual Therapy:    -     mins  70599;  Therapeutic Exercise:    15     mins  35180;     Neuromuscular Myron:    -    mins  34146;    Therapeutic Activity:     -     mins  36588;     Gait Training:      -     mins  26285;     Ultrasound:     -     mins  03060;    Electrical Stimulation:    -     mins  01448 ( );    Low Eval                       -      Mins  90028  Mod Eval                        30     Mins  95504  High Eval                       -     Mins  03106    Untimed:  Electrical Stimulation:    -     mins  90035 ( );  Mechanical Traction:    15     mins  30392;     Timed Treatment:   15   mins   Total Treatment:     70   mins    PT SIGNATURE: WALI Arroyo license: [id number]  DATE TREATMENT INITIATED: 3/28/2023    Initial Certification  Certification Period: 6/26/2023  I certify that the therapy services are furnished while this patient is under my care.  The services outlined above are required by this patient, and will be reviewed every 90 days.     PHYSICIAN: Alphonso Villa MD      DATE:     Please sign and return via fax to [phone]. Thank you, [facility].

## 2023-04-06 ENCOUNTER — TREATMENT (OUTPATIENT)
Dept: PHYSICAL THERAPY | Facility: CLINIC | Age: 53
End: 2023-04-06
Payer: COMMERCIAL

## 2023-04-06 DIAGNOSIS — R29.898 IMPAIRED FLEXIBILITY OF LOWER EXTREMITY: ICD-10-CM

## 2023-04-06 DIAGNOSIS — M54.41 ACUTE BILATERAL LOW BACK PAIN WITH BILATERAL SCIATICA: Primary | ICD-10-CM

## 2023-04-06 DIAGNOSIS — M53.86 DECREASED ROM OF LUMBAR SPINE: ICD-10-CM

## 2023-04-06 DIAGNOSIS — M54.42 ACUTE BILATERAL LOW BACK PAIN WITH BILATERAL SCIATICA: Primary | ICD-10-CM

## 2023-04-06 PROCEDURE — 97012 MECHANICAL TRACTION THERAPY: CPT | Performed by: PHYSICAL THERAPIST

## 2023-04-06 PROCEDURE — 97110 THERAPEUTIC EXERCISES: CPT | Performed by: PHYSICAL THERAPIST

## 2023-04-06 NOTE — PROGRESS NOTES
Physical Therapy Daily Treatment Note      Patient: Randall Morin   : 1970  Referring practitioner: Alphonso Villa,*  Date of Initial Visit: Type: THERAPY  Noted: 3/28/2023  Today's Date: 2023  Patient seen for 2 sessions         Randall Morin reports: it is better the last few days; I think the traction helped, stretches are good increase. No back pain; but mild numbness/tingling between each leg. No sleep disturbance.       Objective     Active Range of Motion      Lumbar   Flexion: Active lumbar flexion: limited ~50% with back pain  Extension: WFL  Left lateral flexion: WFL  Right lateral flexion: WFL  Left rotation: WFL  Right rotation: WFL    Lumbar Flexibility Comments:   HS stretch/SLR on RLE: 62 degrees  HS stretch/SLR on LLE: 66 degrees     See Exercise, Manual, and Modality Logs for complete treatment.       Assessment/Plan  Improving LE flexibility with increased HS length and decreased back pain. Added piriformis stretch and sciatic nerve glides to help with numbness/tingling down BLE and continued mechanical lumbar traction. Pt going out of town and calling once he gets back to schedule.        Progress per Plan of Care and Progress strengthening /stabilization /functional activity           Timed:  Manual Therapy:    -     mins  85754;  Therapeutic Exercise:    25     mins  05303;     Neuromuscular Myron:    -    mins  48225;    Therapeutic Activity:     -     mins  10022;     Gait Training:      -     mins  92573;     Ultrasound:     -     mins  73486;      Untimed:  Electrical Stimulation:    -     mins  28980 ( );  Mechanical Traction:    15     mins  08436;   Dry needling:      -     mins  37661/    Timed Treatment:   25   mins   Total Treatment:     40   mins  Keri Manzo PT  Physical Therapist    License #: [id number]

## 2023-05-30 ENCOUNTER — TELEPHONE (OUTPATIENT)
Dept: FAMILY MEDICINE CLINIC | Facility: CLINIC | Age: 53
End: 2023-05-30

## 2023-05-30 NOTE — TELEPHONE ENCOUNTER
Caller: Randall Morin    Relationship: Self    Best call back number: [phone]    What specialty or service is being requested: SLEEP STUDY     What is the provider, practice or medical service name: [facility]     What is the office location: [facility] LOCATION     PLEASE CALL AND ADVISE

## 2023-06-01 ENCOUNTER — OFFICE VISIT (OUTPATIENT)
Dept: FAMILY MEDICINE CLINIC | Facility: CLINIC | Age: 53
End: 2023-06-01

## 2023-06-01 VITALS
HEART RATE: 81 BPM | BODY MASS INDEX: 25.45 KG/M2 | SYSTOLIC BLOOD PRESSURE: 122 MMHG | TEMPERATURE: 96.8 F | DIASTOLIC BLOOD PRESSURE: 68 MMHG | WEIGHT: 192 LBS | HEIGHT: 73 IN

## 2023-06-01 DIAGNOSIS — R40.0 DAYTIME SLEEPINESS: ICD-10-CM

## 2023-06-01 DIAGNOSIS — R06.83 SNORING: Primary | ICD-10-CM

## 2023-06-01 DIAGNOSIS — R06.81 WITNESSED EPISODE OF APNEA: ICD-10-CM

## 2023-06-01 PROCEDURE — 99213 OFFICE O/P EST LOW 20 MIN: CPT | Performed by: NURSE PRACTITIONER

## 2023-06-01 NOTE — PROGRESS NOTES
"Chief Complaint  Sleep Apnea (Pt requesting referral for sleep study )    Subjective        Randall Morin presents to [facility]  History of Present Illness patient is here requesting referral for sleep study.  He had 1 prior to COVID that was over 2 nights and was inconclusive.  Patient reports he is not a good sleeper in general and the first night he got very little sleep and second night only about 6 which is his norm.  He did not follow up after that due to COVID.    His spouse reports loud snoring nightly with witnessed apneas and he does have some daytime sleepiness.  He is the largest of his brothers who all also have sleep apnea although he certainly is not obese.      Objective   Vital Signs:  /68   Pulse 81   Temp 96.8 °F (36 °C)   Ht 185.4 cm (73\")   Wt 87.1 kg (192 lb)   BMI 25.33 kg/m²   Estimated body mass index is 25.33 kg/m² as calculated from the following:    Height as of this encounter: 185.4 cm (73\").    Weight as of this encounter: 87.1 kg (192 lb).       BMI is >= 25 and <30. (Overweight) The following options were offered after discussion;: weight loss educational material (shared in after visit summary)      Physical Exam  Vitals and nursing note reviewed.   Constitutional:       General: He is not in acute distress.     Appearance: He is well-developed. He is not ill-appearing or diaphoretic.   HENT:      Head: Normocephalic and atraumatic.      Mouth/Throat:      Comments: Does appear to have more narrow oropharynx  Eyes:      General:         Right eye: No discharge.         Left eye: No discharge.      Conjunctiva/sclera: Conjunctivae normal.   Cardiovascular:      Rate and Rhythm: Normal rate and regular rhythm.      Heart sounds: Normal heart sounds.   Pulmonary:      Effort: Pulmonary effort is normal.      Breath sounds: Normal breath sounds.   Abdominal:      General: Bowel sounds are normal.      Palpations: Abdomen is soft.      Tenderness: " There is no abdominal tenderness.   Musculoskeletal:         General: No deformity.      Cervical back: Neck supple.      Comments: Gait smooth and steady   Lymphadenopathy:      Cervical: No cervical adenopathy.   Skin:     General: Skin is warm and dry.   Neurological:      Mental Status: He is alert and oriented to person, place, and time.   Psychiatric:         Mood and Affect: Mood normal.         Behavior: Behavior normal.        Result Review :                   Assessment and Plan   Diagnoses and all orders for this visit:    1. Snoring (Primary)  -     Ambulatory Referral to Sleep Medicine    2. Daytime sleepiness    3. Witnessed episode of apnea    referred to sleep medicine for eval.  If he does not hear back that scheduled within 2 weeks will let me know.  He is aware of health concerns related to sleep apnea.          Follow Up   No follow-ups on file.  Patient was given instructions and counseling regarding his condition or for health maintenance advice. Please see specific information pulled into the AVS if appropriate.       Answers for HPI/ROS submitted by the patient on 5/31/2023  What is the primary reason for your visit?: Other  Please describe your symptoms.: unable to sleep through night, excessive snoring  Have you had these symptoms before?: Yes  How long have you been having these symptoms?: Greater than 2 weeks

## 2023-08-09 ENCOUNTER — OFFICE VISIT (OUTPATIENT)
Dept: SLEEP MEDICINE | Facility: HOSPITAL | Age: 53
End: 2023-08-09
Payer: COMMERCIAL

## 2023-08-09 VITALS — HEART RATE: 74 BPM | BODY MASS INDEX: 24.78 KG/M2 | WEIGHT: 187 LBS | OXYGEN SATURATION: 97 % | HEIGHT: 73 IN

## 2023-08-09 DIAGNOSIS — G47.9 RESTLESS SLEEPER: ICD-10-CM

## 2023-08-09 DIAGNOSIS — R06.81 WITNESSED EPISODE OF APNEA: Primary | ICD-10-CM

## 2023-08-09 DIAGNOSIS — G47.8 NON-RESTORATIVE SLEEP: ICD-10-CM

## 2023-08-09 PROCEDURE — G0463 HOSPITAL OUTPT CLINIC VISIT: HCPCS

## 2023-08-09 NOTE — PROGRESS NOTES
[facility]  [address]  [place] [address]  Phone   Fax       Randall Morin  [id number]   1970  53 y.o.  male      Referring Provider and PCP: Dayanna Bowers APRN    Type of service: Initial Sleep Medicine Consult.  Date of service: 8/9/2023          CHIEF COMPLAINT: Suspected sleep apnea      HISTORY OF PRESENT ILLNESS:  Thank you for asking us to see Randall Morin.  Randall Morin 53 y.o. was seen today on 8/9/2023 at [facility].  Patient presents today with symptoms of snoring, non-restorative sleep, and witnessed apneas.  The symptoms are chronic and persistent in nature.  Patient has no history of tonsillectomy, adenoidectomy, nasal surgery, UPPP.   He reports he had a sleep study before COVID-19 epidemic showing borderline sleep apnea but the place close down and he was not able to get any records from them.  It can take him an hour or more to fall asleep at night, sometimes quicker.  Usually wakes up 2 times per night on average, sometimes falls back to sleep within 5 minutes, other x 20 minutes.  Sometimes stress makes it hard for him to fall asleep at night.  Getting about 6 hours of sleep at night.  Usually taking at least 1 nap a day.  History of witnessed apnea.      SLEEP HISTORY:  Sleep schedule:  Bedtime: 11 PM to midnight  Wake time: 630 to 7 AM  Normally takes 0 to 60 minutes to fall asleep  Average hours of sleep: 6  Number of naps per day: 1    Symptoms:   In addition to the above, patient reports the following associated symptoms:  Have you ever awakened gasping for breath, coughing, choking: Yes   Change in weight:  No   Morning headaches:  No   Awaken with a sore throat or dry mouth:  Yes   Leg jerking at night:  No   Creepy crawly feeling in legs/urge to move legs: Yes   Teeth grinding: Yes   Have you ever awakened at night with a sour taste or burning sensation in your chest:  Yes   Do you have muscle  "weakness with laughing or anger:  No   Have you ever felt paralyzed while going to sleep or waking up:  No   Sleepwalking: No   Nightmares: No   Nocturia (urination at night): 0 times per night  Memory Problem: No     Medical Conditions (PMH):   Anxiety/depression    Social history:  Do you drive a commercial vehicle:  No   Shift work:  No   Tobacco use:  No   Alcohol use: 6 per week  Caffeinated drinks: 2 sodas per day    Family History (parents and siblings) (pertaining to sleep medicine):  Sleep apnea  Prostate cancer    Medications: reviewed    Allergies:  Patient has no known allergies.      REVIEW OF SYSTEMS:  Pertinent positive symptoms are:  Snoring  Witnessed apnea  [place] Sleepiness Scale of Total score: 12   Fatigue         PHYSICAL EXAM:  CONSTITUTINONAL:   Vitals:    08/09/23 1120   Pulse: 74   SpO2: 97%   Weight: 84.8 kg (187 lb)   Height: 185.4 cm (73\")    Body mass index is 24.67 kg/mý.   HEAD: atraumatic, normocephalic   THROAT: tonsils are not enlarged, Mallampati class III-IV  NECK: Neck Circumference: 16.5 inches, trachea is midline  RESPIRATORY SYSTEM: Respirations even, unlabored, normal rate  CARDIOVASULAR SYSTEM: Normal rate, no edema  NEUROLOGICAL SYSTEM: Alert and oriented x 3, normal gait  PSYCHIATRIC SYSTEM: Mood is normal/ appropriate     Office note(s) from care team reviewed. Office note(s) dated 6/1/2023, reviewed.    Labs/ Test Results Reviewed:  TSH          9/15/2022    11:55   TSH   TSH 1.310                  ASSESSMENT AND PLAN:   Witnessed apnea (R06.81): patient's symptoms and physical examination are concerning for possible sleep apnea (G47.30).   I discussed the signs, symptoms, and pathophysiology of sleep apnea with this patient.  I also discussed the potential complications of untreated sleep apnea including but not limited to resistant hypertension, insulin resistance, pulmonary hypertension, atrial fibrillation, stroke, nonrestorative sleep with hypersomnia which can " increase risk for motor vehicle accidents, etc.   Different testing methods including home-based and lab based sleep studies were discussed with this patient.   Based on patient history and physical examination, the most appropriate study is home sleep study.  The order for the sleep study is placed in [facility].  The test will be scheduled after prior authorization has been obtained through patient's insurance.  Treatment and management will be discussed in more detail with this patient after the test is completed.  All questions were answered to patient's satisfaction.   Snoring (R06.83): snoring is the sound created by turbulent airflow vibrating upper airway soft tissue.  I have also discussed factors affecting snoring including sleep deprivation, sleeping on the back and alcohol ingestion. To minimize snoring, patient is advised to have adequate sleep, sleep on their side, and avoid alcohol and sedative medications around bedtime.   Excessive daytime sleepiness: [place] Sleepiness Scale of Total score: 12.  There are many causes of excessive daytime sleepiness including but not limited to sleep apnea, shiftwork syndrome, depression, and other medical disorders such as heart disease, kidney disease, and liver failure.  The most serious cause of excessive sleepiness is due to neurological conditions such as narcolepsy/cataplexy.  More commonly excessive sleepiness is caused by sleep apnea with frequent awakenings during sleep time.  I have discussed safety of driving and to remain vigilant while driving.  Anxiety: denies SI/HI.  Continue to address with managing providers.    I have also discussed with the patient the following  Sleep hygiene: try to maintain a regular bed time and wake time, avoid watching TV/ using electronic devices in bed (including cell phones), limit caffeinated and alcoholic beverages before bed, try to maintain a cool and quiet sleep environment, avoid daytime naps  Adequate amount of sleep:  most people need around 7 to 8 hours of sleep each night      Patient will follow-up after study, 31 to 90 days after PAP therapy initiated if applicable, or contact the office sooner for questions or concerns. Patient's questions were answered.            Thank you again for asking me to consult on this patient.  Please do not hesitate to call me if you have additional questions or concerns.       Machelle Ghosh DNP, APRN  [facility]

## 2023-08-17 ENCOUNTER — HOSPITAL ENCOUNTER (OUTPATIENT)
Dept: SLEEP MEDICINE | Facility: HOSPITAL | Age: 53
End: 2023-08-17
Payer: COMMERCIAL

## 2023-08-17 DIAGNOSIS — R06.81 WITNESSED EPISODE OF APNEA: ICD-10-CM

## 2023-08-17 DIAGNOSIS — G47.8 NON-RESTORATIVE SLEEP: ICD-10-CM

## 2023-08-17 DIAGNOSIS — G47.9 RESTLESS SLEEPER: ICD-10-CM

## 2023-08-17 PROCEDURE — 95806 SLEEP STUDY UNATT&RESP EFFT: CPT

## 2023-08-17 PROCEDURE — 95806 SLEEP STUDY UNATT&RESP EFFT: CPT | Performed by: FAMILY MEDICINE

## 2023-08-23 ENCOUNTER — TELEPHONE (OUTPATIENT)
Dept: SLEEP MEDICINE | Facility: HOSPITAL | Age: 53
End: 2023-08-23
Payer: COMMERCIAL

## 2023-08-23 NOTE — TELEPHONE ENCOUNTER
Tried to call pt about results. Lm on vm. Study negative for ISHAN overall, mild positional ishan. Pt to f/u @ PCP and  for insomnia.

## 2024-05-20 ENCOUNTER — OFFICE VISIT (OUTPATIENT)
Dept: SPORTS MEDICINE | Facility: CLINIC | Age: 54
End: 2024-05-20
Payer: COMMERCIAL

## 2024-05-20 VITALS
DIASTOLIC BLOOD PRESSURE: 66 MMHG | TEMPERATURE: 97.8 F | HEIGHT: 73 IN | WEIGHT: 187 LBS | HEART RATE: 69 BPM | SYSTOLIC BLOOD PRESSURE: 120 MMHG | OXYGEN SATURATION: 96 % | RESPIRATION RATE: 14 BRPM | BODY MASS INDEX: 24.78 KG/M2

## 2024-05-20 DIAGNOSIS — G89.29 CHRONIC RIGHT-SIDED THORACIC BACK PAIN: Chronic | ICD-10-CM

## 2024-05-20 DIAGNOSIS — M54.41 CHRONIC RIGHT-SIDED LOW BACK PAIN WITH BILATERAL SCIATICA: Primary | Chronic | ICD-10-CM

## 2024-05-20 DIAGNOSIS — M54.42 CHRONIC RIGHT-SIDED LOW BACK PAIN WITH BILATERAL SCIATICA: Primary | Chronic | ICD-10-CM

## 2024-05-20 DIAGNOSIS — M54.6 CHRONIC RIGHT-SIDED THORACIC BACK PAIN: Chronic | ICD-10-CM

## 2024-05-20 DIAGNOSIS — G89.29 CHRONIC RIGHT-SIDED LOW BACK PAIN WITH BILATERAL SCIATICA: Primary | Chronic | ICD-10-CM

## 2024-05-20 PROCEDURE — 99214 OFFICE O/P EST MOD 30 MIN: CPT | Performed by: FAMILY MEDICINE

## 2024-05-20 NOTE — PROGRESS NOTES
"Chief Complaint  Pain and Follow-up of the Lumbar Spine (Not any better but mot as bad today)    Subjective        Randall Morin presents to [facility]  History of Present Illness  Continues to suffer from and primarily right-sided lower back pain with occasional paresthesias into either leg.  Pain is worse at night or with movement.  Also worse after activity.  No alarm symptoms.  Patient has completed physical therapy but has not seen any significant improvement.  He points to the area of discomfort and it appears to be in the lower thoracic upper lumbar area.        Objective   Vital Signs:  /66 (BP Location: Left arm, Patient Position: Sitting, Cuff Size: Adult)   Pulse 69   Temp 97.8 °F (36.6 °C) (Infrared)   Resp 14   Ht 185.4 cm (73\")   Wt 84.8 kg (187 lb)   SpO2 96%   BMI 24.67 kg/m²   Estimated body mass index is 24.67 kg/m² as calculated from the following:    Height as of this encounter: 185.4 cm (73\").    Weight as of this encounter: 84.8 kg (187 lb).       BMI is within normal parameters. No other follow-up for BMI required.      Physical Exam  Vitals reviewed.   Constitutional:       Appearance: He is well-developed.   HENT:      Head: Normocephalic and atraumatic.   Eyes:      Conjunctiva/sclera: Conjunctivae normal.      Pupils: Pupils are equal, round, and reactive to light.   Cardiovascular:      Comments: No peripheral edema  Pulmonary:      Effort: Pulmonary effort is normal.   Musculoskeletal:      Comments: Lower thoracic upper lumbar spine area there is tenderness to palpation of the paravertebral bundles on the right side.  The pain is worse with right lateral bending but also pain on the right side with left lateral bending.  Patient begins to get pain in this area and in the lower lumbar spine and right hip area with forward flexion past 30 degrees and with extension past 10 degrees.  Equivocal bilateral straight leg raise.  Motor 5 out of 5. "   Skin:     General: Skin is warm and dry.   Neurological:      Mental Status: He is alert and oriented to person, place, and time.   Psychiatric:         Behavior: Behavior normal.        Result Review :          XR Spine Lumbar 2 or 3 View (03/13/2023 09:53)-independent review of images do show a lower thoracic dextroscoliosis and lumbar levoscoliosis.  Mild retrolisthesis of L2 on L3, L3-L4, L5-S1.  Degenerative changes seen in the endplates at multiple levels.  Also has evidence of facet arthropathies.           Assessment and Plan     Diagnoses and all orders for this visit:    1. Chronic right-sided low back pain with bilateral sciatica (Primary)  -     MRI Lumbar Spine Without Contrast; Future    2. Chronic right-sided thoracic back pain  -     MRI Thoracic Spine Without Contrast; Future    Because patient has not improved with conservative treatments we will need to check MRI of both the thoracic and lumbar spine.  Follow-up after MRIs.         Follow Up     No follow-ups on file.  Patient was given instructions and counseling regarding his condition or for health maintenance advice. Please see specific information pulled into the AVS if appropriate.

## 2024-06-05 ENCOUNTER — TELEPHONE (OUTPATIENT)
Dept: SPORTS MEDICINE | Facility: CLINIC | Age: 54
End: 2024-06-05

## 2024-06-05 NOTE — TELEPHONE ENCOUNTER
Spoke with patient and scheduled him with Dr Villa on 6/24 at 340 pm for an MRI follow up.  Also advised patient to give us until the beginning of next week to get the MRI reports from [place], let one of other providers take a look at it, call with results and if we need to reschedule the appointment to see one of our other providers.  Patient verbalized understanding and no further action is needed at this time.

## 2024-06-05 NOTE — TELEPHONE ENCOUNTER
Caller: Randall Morin    Relationship to patient: Self    Best call back number: [phone]*    Chief complaint: BACK PAIN    Type of visit: MRI FOLLOW UP    Requested date: ASAP     Additional notes:PT GOT THE MRI TODAY .. PT ALSO HAS MRI DISC TO BRING TO THE OFFICE FOR THE APPOINTMENT.. PLEASE ADVISE..

## 2024-06-24 ENCOUNTER — OFFICE VISIT (OUTPATIENT)
Dept: SPORTS MEDICINE | Facility: CLINIC | Age: 54
End: 2024-06-24
Payer: COMMERCIAL

## 2024-06-24 VITALS
OXYGEN SATURATION: 97 % | BODY MASS INDEX: 24.78 KG/M2 | SYSTOLIC BLOOD PRESSURE: 108 MMHG | WEIGHT: 187 LBS | TEMPERATURE: 97.6 F | HEIGHT: 73 IN | DIASTOLIC BLOOD PRESSURE: 60 MMHG | HEART RATE: 85 BPM

## 2024-06-24 DIAGNOSIS — G89.29 CHRONIC BILATERAL LOW BACK PAIN, UNSPECIFIED WHETHER SCIATICA PRESENT: Chronic | ICD-10-CM

## 2024-06-24 DIAGNOSIS — M54.50 CHRONIC BILATERAL LOW BACK PAIN, UNSPECIFIED WHETHER SCIATICA PRESENT: Chronic | ICD-10-CM

## 2024-06-24 DIAGNOSIS — M47.819 FACET ARTHROPATHY, MULTILEVEL: Primary | Chronic | ICD-10-CM

## 2024-06-24 PROCEDURE — 99213 OFFICE O/P EST LOW 20 MIN: CPT | Performed by: FAMILY MEDICINE

## 2024-06-24 NOTE — PROGRESS NOTES
"Chief Complaint  Back Pain (LUMBAR SPINE- Patient is here to follow up on MRI results. )    Subjective        Randall Morin presents to [facility]  Back Pain      Follow-up chronic lower thoracic and lower spine pain.  Currently most of the patient's pain is bilateral and centered around the area that he points to consistent with L2-L3.  Patient currently not having any radicular symptoms although he has had radicular symptoms in both legs in the past.  Patient has been treated with physical therapy without significant improvement.  He has significant scoliosis long with facet hypertrophy on his x-rays.  Patient here to review recent MRI thoracic and lumbar spine.    Objective   Vital Signs:  /60 (BP Location: Left arm, Patient Position: Sitting, Cuff Size: Adult)   Pulse 85   Temp 97.6 °F (36.4 °C) (Temporal)   Ht 185.4 cm (73\")   Wt 84.8 kg (187 lb)   SpO2 97%   BMI 24.67 kg/m²   Estimated body mass index is 24.67 kg/m² as calculated from the following:    Height as of this encounter: 185.4 cm (73\").    Weight as of this encounter: 84.8 kg (187 lb).       BMI is within normal parameters. No other follow-up for BMI required.      Physical Exam  Vitals reviewed.   Constitutional:       Appearance: He is well-developed.   HENT:      Head: Normocephalic and atraumatic.   Eyes:      Conjunctiva/sclera: Conjunctivae normal.      Pupils: Pupils are equal, round, and reactive to light.   Cardiovascular:      Comments: No peripheral edema  Pulmonary:      Effort: Pulmonary effort is normal.   Musculoskeletal:      Comments: Mild TTP over the paravertebral bundles bilaterally in the upper lumbar spine.  Motor 5 out of 5 lower extremities.   Skin:     General: Skin is warm and dry.   Neurological:      Mental Status: He is alert and oriented to person, place, and time.   Psychiatric:         Behavior: Behavior normal.        Result Review :            IMAGING SCANNED " (05/20/2024)  IMAGING SCANNED (05/20/2024)         Assessment and Plan     Diagnoses and all orders for this visit:    1. Facet arthropathy, multilevel (Primary)  -     Ambulatory Referral to Pain Management    2. Chronic bilateral low back pain, unspecified whether sciatica present  -     Ambulatory Referral to Pain Management    Cussed with the patient that he does have some significant facet hypertrophy and changes of arthritis.  In the lower lumbar spine these are causing some moderate to severe foraminal stenosis.  Will refer to pain management for their consultation.         Follow Up     No follow-ups on file.  Patient was given instructions and counseling regarding his condition or for health maintenance advice. Please see specific information pulled into the AVS if appropriate.

## 2024-07-10 ENCOUNTER — PREP FOR SURGERY (OUTPATIENT)
Dept: SURGERY | Facility: SURGERY CENTER | Age: 54
End: 2024-07-10
Payer: COMMERCIAL

## 2024-07-10 ENCOUNTER — OFFICE VISIT (OUTPATIENT)
Dept: PAIN MEDICINE | Facility: CLINIC | Age: 54
End: 2024-07-10
Payer: COMMERCIAL

## 2024-07-10 VITALS
TEMPERATURE: 96.6 F | OXYGEN SATURATION: 97 % | WEIGHT: 185.4 LBS | BODY MASS INDEX: 24.57 KG/M2 | RESPIRATION RATE: 16 BRPM | HEIGHT: 73 IN | HEART RATE: 82 BPM | SYSTOLIC BLOOD PRESSURE: 95 MMHG | DIASTOLIC BLOOD PRESSURE: 61 MMHG

## 2024-07-10 DIAGNOSIS — M51.36 DDD (DEGENERATIVE DISC DISEASE), LUMBAR: ICD-10-CM

## 2024-07-10 DIAGNOSIS — M47.816 LUMBAR FACET ARTHROPATHY: Primary | ICD-10-CM

## 2024-07-10 PROCEDURE — 99214 OFFICE O/P EST MOD 30 MIN: CPT | Performed by: NURSE PRACTITIONER

## 2024-07-10 NOTE — PROGRESS NOTES
CHIEF COMPLAINT  Back Pain    Subjective   Randall Morin is a 54 y.o. male.   He presents to the office for evaluation of back pain. He was referred here by Dr. Alphonso Villa.     Patient presents today complaining of low back pain which she has had for many years.  It has become progressively worse over the previous year.  He underwent physical therapy last year without improvement of symptoms.  No history of spine surgery.  He has no interventional pain management history.    Pain today is rated as a 5/10 VAS.  The most severe pain is present in the lower lumbar spine and across the low back in a bandlike distribution. Occasional pain in both lateral hips.  He reports pain is aggravated by prolonged walking, standing, sitting.  Pain does improve with certain movements/stretching.  Advil is taken on occasion which does help but tries to avoid.      No blood thinners, not diabetic.      History of Present Illness     PEG Assessment   What number best describes your pain on average in the past week?5  What number best describes how, during the past week, pain has interfered with your enjoyment of life?7  What number best describes how, during the past week, pain has interfered with your general activity?  6        Current Outpatient Medications:   •  citalopram (CeleXA) 20 MG tablet, Take 1 tablet by mouth Daily. Take 1 tablets daily, Disp: , Rfl:   •  lamoTRIgine (LaMICtal) 100 MG tablet, Take 0.5 tablets by mouth Daily., Disp: , Rfl:     The following portions of the patient's history were reviewed and updated as appropriate: allergies, current medications, past family history, past medical history, past social history, past surgical history, and problem list.      REVIEW OF PERTINENT MEDICAL DATA        Patient was evaluated by Dr. Alphonso Villa on 6/24/2024.  Note reviewed.  This was in review of lumbar and thoracic MRI.  Based on imaging and patient's pain pattern he feels that he is symptomatic from  "facet hypertrophy and arthritis.  Recommends pain management for consultation.    Review of Systems   Constitutional:  Negative for activity change, fatigue and fever.   Respiratory:  Negative for cough and chest tightness.    Cardiovascular:  Negative for chest pain.   Gastrointestinal:  Negative for abdominal pain, constipation and diarrhea.   Genitourinary:  Negative for difficulty urinating and dysuria.   Musculoskeletal:  Positive for back pain.   Neurological:  Negative for dizziness, weakness, light-headedness, numbness and headaches.   Psychiatric/Behavioral:  Positive for sleep disturbance (pain). Negative for agitation and suicidal ideas. The patient is not nervous/anxious.        I have reviewed and confirmed the accuracy of the ROS as documented by the MA/LPN/RN ANTWON Almodovar      Vitals:    07/10/24 1518   BP: 95/61   BP Location: Left arm   Patient Position: Sitting   Cuff Size: Adult   Pulse: 82   Resp: 16   Temp: 96.6 °F (35.9 °C)   TempSrc: Temporal   SpO2: 97%   Weight: 84.1 kg (185 lb 6.4 oz)   Height: 185.4 cm (73\")   PainSc:   5         Objective       Physical Exam  Vitals and nursing note reviewed.   Constitutional:       General: He is not in acute distress.     Appearance: Normal appearance. He is not ill-appearing.   Pulmonary:      Effort: Pulmonary effort is normal. No respiratory distress.   Musculoskeletal:      Lumbar back: Tenderness and bony tenderness present. Negative right straight leg raise test and negative left straight leg raise test.      Comments: +lumbar facet tenderness/loading    Neurological:      Mental Status: He is alert and oriented to person, place, and time.      Motor: Motor function is intact. No weakness.      Gait: Gait normal.   Psychiatric:         Mood and Affect: Mood normal.         Behavior: Behavior normal.     Assessment & Plan   Diagnoses and all orders for this visit:    1. Lumbar facet arthropathy (Primary)    2. DDD (degenerative disc " disease), lumbar        --- Bilateral L3-L5 MBB X 2    Diagnostic Facet Joint Procedure:   MBB     The first diagnostic facet joint procedure is considered medically reasonable and necessary for the diagnosis and treatment of chronic pain for this patient due to the patient meeting all of the following criteria:    - 1. Moderate to severe chronic neck or low back pain, predominantly axial, that causes functional deficit measured on pain or disability scale.  - 2. Pain present for minimum of 3 months with documented failure to respond to noninvasive conservative management (as tolerated)  - 3. Absence of untreated radiculopathy or neurogenic claudication (except for radiculopathy caused by facet joint synovial cyst)  - 4. There is no non-facet pathology per clinical assessment or radiology studies that could explain the source of the patient’s pain, including but not limited to fracture, tumor, infection, or significant deformity.    The confirmatory diagnostic facet joint procedure is considered medically reasonable and necessary for the diagnosis and treatment of chronic pain for this patient due to the patient meeting all of the following criteria:    - 1. Moderate to severe chronic neck or low back pain, predominantly axial, that causes functional deficit measured on pain or disability scale.  - 2. Pain present for minimum of 3 months with documented failure to respond to noninvasive conservative management (as tolerated)  - 3. Absence of untreated radiculopathy or neurogenic claudication (except for radiculopathy caused by facet joint synovial cyst)  - 4. There is no non-facet pathology per clinical assessment or radiology studies that could explain the source of the patient’s pain, including but not limited to fracture, tumor, infection, or significant deformity.    The patient has also shown a consistent positive response of at least 80% relief of primary (index) pain (with the duration of relief being  consistent with the agent used).      --- Randall Morin reports a pain score of 5.  Given his pain assessment as noted, treatment options were discussed and the following options were decided upon as a follow-up plan to address the patient's pain:  see plan .    --- Follow-up MBB #1 and 1 week post procedure          Dictated utilizing Dragon dictation.

## 2024-07-11 PROBLEM — M47.816 LUMBAR FACET ARTHROPATHY: Status: ACTIVE | Noted: 2024-07-10

## 2024-07-12 ENCOUNTER — TRANSCRIBE ORDERS (OUTPATIENT)
Dept: SURGERY | Facility: SURGERY CENTER | Age: 54
End: 2024-07-12
Payer: COMMERCIAL

## 2024-07-12 DIAGNOSIS — Z41.9 SURGERY, ELECTIVE: Primary | ICD-10-CM

## 2024-07-29 ENCOUNTER — TELEPHONE (OUTPATIENT)
Dept: PAIN MEDICINE | Facility: CLINIC | Age: 54
End: 2024-07-29

## 2024-07-29 NOTE — TELEPHONE ENCOUNTER
Caller: Randall Morin    Relationship to patient: Self    Best call back number: [phone]    Chief complaint: BACK PAIN     Type of visit: INJECTION     Requested date: NOT 8-15 OR 8-16     If rescheduling, when is the original appointment: 8-16-24     Additional notes: RESCHEDULING DUE TO CONFLICT

## 2024-08-15 PROCEDURE — S0260 H&P FOR SURGERY: HCPCS | Performed by: ANESTHESIOLOGY

## 2024-08-16 ENCOUNTER — HOSPITAL ENCOUNTER (OUTPATIENT)
Facility: SURGERY CENTER | Age: 54
Setting detail: HOSPITAL OUTPATIENT SURGERY
Discharge: HOME | End: 2024-08-16
Attending: ANESTHESIOLOGY | Admitting: ANESTHESIOLOGY
Payer: COMMERCIAL

## 2024-08-16 ENCOUNTER — HOSPITAL ENCOUNTER (OUTPATIENT)
Dept: GENERAL RADIOLOGY | Facility: SURGERY CENTER | Age: 54
End: 2024-08-16
Payer: COMMERCIAL

## 2024-08-16 VITALS
SYSTOLIC BLOOD PRESSURE: 112 MMHG | DIASTOLIC BLOOD PRESSURE: 71 MMHG | WEIGHT: 185 LBS | HEART RATE: 67 BPM | HEIGHT: 73 IN | TEMPERATURE: 98.6 F | BODY MASS INDEX: 24.52 KG/M2 | RESPIRATION RATE: 16 BRPM | OXYGEN SATURATION: 94 %

## 2024-08-16 DIAGNOSIS — Z41.9 SURGERY, ELECTIVE: ICD-10-CM

## 2024-08-16 PROCEDURE — 64493 INJ PARAVERT F JNT L/S 1 LEV: CPT | Performed by: ANESTHESIOLOGY

## 2024-08-16 PROCEDURE — 77002 NEEDLE LOCALIZATION BY XRAY: CPT

## 2024-08-16 PROCEDURE — 76000 FLUOROSCOPY <1 HR PHYS/QHP: CPT

## 2024-08-16 PROCEDURE — 64494 INJ PARAVERT F JNT L/S 2 LEV: CPT | Performed by: ANESTHESIOLOGY

## 2024-08-16 PROCEDURE — 25510000001 IOPAMIDOL 61 % SOLUTION 30 ML VIAL: Performed by: ANESTHESIOLOGY

## 2024-08-16 PROCEDURE — 25010000002 BUPIVACAINE (PF) 0.5 % SOLUTION 10 ML VIAL: Performed by: ANESTHESIOLOGY
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
## 2024-08-16 NOTE — OP NOTE
Bilateral L2-4 Lumbar Medial Branch Blockade  [facility]      PREOPERATIVE DIAGNOSIS:  Lumbar spondylosis without myelopathy    POSTOPERATIVE DIAGNOSIS:  Lumbar spondylosis without myelopathy    PROCEDURE:   Diagnostic Bilateral Lumbar Medial Branch Nerve Blockades, with fluoroscopy:  L2, L3, and L4 nerves (at the L3 & L4 & L5 transverse processes) to block facet joints L3-4 and L4-5 bilaterally  65896-18 -- Bilateral Lumbar Facet blocks, 1st Level  46354-96 -- Bilateral Lumbar Facet blocks, 2nd  Level    PRE-PROCEDURE DISCUSSION WITH PATIENT:    Risks and complications were discussed with the patient prior to starting the procedure and informed consent was obtained.      SURGEON:  Edson Pearce MD    REASON FOR PROCEDURE:    Tenderness of the affected facet joints on exam under fluoroscopy    SEDATION:  Patient declined administration of moderate sedation    ANESTHETIC:  Marcaine 0.5%  STEROID:  NONE  TOTAL VOLUME OF SOLUTION:  6 mL    DESCRIPTON OF PROCEDURE:  After obtaining informed consent, IV access was not obtained in the preoperative area.   The patient was taken to the operating room.  The patient was placed in the prone position with a pillow under the abdomen. All pressure points were well padded.  EKG, blood pressure, and pulse oximeter were monitored.  The patient was monitored and sedated by the RN under my direction. The lumbosacral area was prepped with Chloraprep and draped in a sterile fashion. Under fluoroscopic guidance the transverse processes of the L3 & L4 & L5 vertebrae at the junctions of the superior articular processes were identified on the right.  Skin and subcutaneous tissue were anesthetized with 1% lidocaine above each of these points. A spinal needle was introduced under fluoroscopic guidance at the above junctions. Aspiration was negative for blood and CSF.  After confirming the position of the needle with fluoroscope in all views, 0.25 mL of Omnipaque was  injected, and after seeing the proper spread a total of 1 mL of the anesthetic solution noted above was injected at each of these points.  Needles were removed intact from each of the areas.  A similar procedure was repeated to block the L2 & L3 and L4 nerves on the contralateral side.   Onset of analgesia was noted.  Vital signs remained stable throughout.      ESTIMATED BLOOD LOSS:  <5 mL  SPECIMENS:  none    COMPLICATIONS:   No complications were noted. and There was no indication of vascular uptake on live injection of contrast dye.    TOLERANCE & DISCHARGE CONDITION:    The patient tolerated the procedure well.  The patient was transported to the recovery area without difficulties.  The patient was discharged to home under the care of family in stable and satisfactory condition.    PLAN OF CARE:  The patient was given our standard instruction sheet.  We discussed that Lumbar Medial Branch Blockade is a diagnostic procedure in consideration for radiofrequency ablation if two diagnostic procedures prove to be positive for significant benefit.  If sustained relief of 6 to eight weeks is obtained, then an alternative plan could be therapeutic lumbar branch blockades.  The patient is asked to keep a pain log each hour for 8 hours after the procedure today.  The patient will  Return to clinic 1 wk -of note, he had more pain on palpation of L4-5 and L3-4 than he did at L5-S1.  But if his results today are incomplete then on a repeat medial branch block it would be reasonable to include the L5 medial branch to fully cover the L5-S1 joint  The patient will resume all medications as per the medication reconciliation sheet.

## 2024-08-16 NOTE — DISCHARGE INSTRUCTIONS
[facility] - Post-procedure Instructions          --  While there are no absolute restrictions, it is recommended that you do not perform strenuous activity today. In the morning, you may resume your level of activity as before your block.    --  If you have a band-aid at your injection site, please remove it later today. Observe the area for any redness, swelling, pus-like drainage, or a temperature over 101°. If any of these symptoms occur, please call your doctor at [phone]. If after office hours, leave a message and the on-call provider will return your call.    --  Ice may be applied to your injection site. It is recommended you avoid direct heat (heating pad; hot tub) for 1-2 days.    --  Call [facility] at [phone] if you experience persistent headache, persistent bleeding from the injection site, or severe pain not relieved by heat or oral medication.    --  Do not make important decisions today.    --  Due to the effects of the block and/or the I.V. Sedation, DO NOT drive or operate hazardous machinery for 12 hours.  Local anesthetics may cause numbness after procedure and precautions must be taken with regards to operating equipment as well as with walking, even if ambulating with assistance of another person or with an assistive device.    --  Do not drink alcohol for 12 hours.    -- You may return to work tomorrow, or as directed by your referring doctor.    --  Occasionally you may notice a slight increase in your pain after the procedure. This should start to improve within the next 24-48 hours. Radiofrequency ablation procedure pain may last 3-4 weeks.    --  It may take as long as 3-4 days before you notice a gradual improvement in your pain and/or other symptoms.    -- You may continue to take your prescribed pain medication as needed.    --  Some normal possible side effects of steroid use could include fluid retention, increased blood sugar, dull headache,  increased sweating, increased appetite, mood swings and flushing.    --  Diabetics are recommended to watch their blood glucose level closely for 24-48 hours after the injection.    --  Must stay in PACU for 20 min upon arrival and prove no leg weakness before being discharged.    --  IN THE EVENT OF A LIFE THREATENING EMERGENCY, (CHEST PAIN, BREATHING DIFFICULTIES, PARALYSIS…) YOU SHOULD GO TO YOUR NEAREST EMERGENCY ROOM.    --  You should be contacted by our office within 2-3 days to schedule follow up or next appointment date.  If not contacted within 7 days, please call the office at [phone]

## 2024-08-23 ENCOUNTER — OFFICE VISIT (OUTPATIENT)
Dept: PAIN MEDICINE | Facility: CLINIC | Age: 54
End: 2024-08-23
Payer: COMMERCIAL

## 2024-08-23 ENCOUNTER — PREP FOR SURGERY (OUTPATIENT)
Dept: SURGERY | Facility: SURGERY CENTER | Age: 54
End: 2024-08-23
Payer: COMMERCIAL

## 2024-08-23 ENCOUNTER — TRANSCRIBE ORDERS (OUTPATIENT)
Dept: SURGERY | Facility: SURGERY CENTER | Age: 54
End: 2024-08-23
Payer: COMMERCIAL

## 2024-08-23 VITALS
TEMPERATURE: 96.6 F | HEART RATE: 67 BPM | RESPIRATION RATE: 18 BRPM | WEIGHT: 184.4 LBS | BODY MASS INDEX: 24.44 KG/M2 | OXYGEN SATURATION: 99 % | SYSTOLIC BLOOD PRESSURE: 110 MMHG | DIASTOLIC BLOOD PRESSURE: 70 MMHG | HEIGHT: 73 IN

## 2024-08-23 DIAGNOSIS — Z41.9 SURGERY, ELECTIVE: Primary | ICD-10-CM

## 2024-08-23 DIAGNOSIS — M47.816 LUMBAR FACET ARTHROPATHY: Primary | ICD-10-CM

## 2024-08-23 DIAGNOSIS — M51.36 DDD (DEGENERATIVE DISC DISEASE), LUMBAR: ICD-10-CM

## 2024-08-23 PROCEDURE — 99214 OFFICE O/P EST MOD 30 MIN: CPT | Performed by: NURSE PRACTITIONER

## 2024-08-23 RX ORDER — SODIUM CHLORIDE 0.9 % (FLUSH) 0.9 %
10 SYRINGE (ML) INJECTION AS NEEDED
OUTPATIENT
Start: 2024-08-23

## 2024-08-23 RX ORDER — SODIUM CHLORIDE 0.9 % (FLUSH) 0.9 %
10 SYRINGE (ML) INJECTION EVERY 12 HOURS SCHEDULED
OUTPATIENT
Start: 2024-08-23

## 2024-08-23 NOTE — PROGRESS NOTES
CHIEF COMPLAINT  Follow-up for back pain.    Subjective   Randall Morin is a 54 y.o. male  who presents to the office for follow-up of procedure.  He completed Bilateral L2-4 Lumbar Medial Branch Blockade #1  on  8/16/2024 performed by Dr. Pearce for management of back pain. Patient reports 85% relief from the procedure for a few days.     Back Pain  This is a recurrent problem. The current episode started more than 1 month ago. The problem is unchanged. The quality of the pain is described as aching. The pain does not radiate. The pain is at a severity of 5/10. The symptoms are aggravated by position, standing, bending and twisting (walking). Pertinent negatives include no numbness or weakness. He has tried NSAIDs, home exercises, heat and ice for the symptoms. The treatment provided mild relief.        PEG Assessment   What number best describes your pain on average in the past week?6  What number best describes how, during the past week, pain has interfered with your enjoyment of life?5  What number best describes how, during the past week, pain has interfered with your general activity?  5    Review of Pertinent Medical Data ---        The following portions of the patient's history were reviewed and updated as appropriate: allergies, current medications, past family history, past medical history, past social history, past surgical history, and problem list.    Review of Systems   Gastrointestinal:  Negative for constipation and diarrhea.   Genitourinary:  Negative for difficulty urinating.   Musculoskeletal:  Positive for back pain.   Neurological:  Negative for weakness and numbness.   Psychiatric/Behavioral:  Negative for sleep disturbance and suicidal ideas. The patient is not nervous/anxious.      I have reviewed and confirmed the accuracy of the ROS as documented by the MA/CHERISEN/RN ANTWON Almodovar     Vitals:    08/23/24 0821   BP: 110/70   Pulse: 67   Resp: 18   Temp: 96.6 °F (35.9 °C)   SpO2: 99%  "  Weight: 83.6 kg (184 lb 6.4 oz)   Height: 185.4 cm (73\")   PainSc:   5   PainLoc: Back         Objective   Physical Exam  Vitals and nursing note reviewed.   Constitutional:       General: He is not in acute distress.     Appearance: Normal appearance. He is not ill-appearing.   Pulmonary:      Effort: Pulmonary effort is normal. No respiratory distress.   Musculoskeletal:      Lumbar back: Tenderness and bony tenderness present.      Comments: +lumbar facet tenderness/loading    Neurological:      Mental Status: He is alert and oriented to person, place, and time.      Gait: Gait normal.   Psychiatric:         Mood and Affect: Mood normal.         Behavior: Behavior normal.           Assessment & Plan   Diagnoses and all orders for this visit:    1. Lumbar facet arthropathy (Primary)    2. DDD (degenerative disc disease), lumbar      --- Bilateral L2-L4 MBB #2    Diagnostic Facet Joint Procedure:   MBB     The confirmatory diagnostic facet joint procedure is considered medically reasonable and necessary for the diagnosis and treatment of chronic pain for this patient due to the patient meeting all of the following criteria:    - 1. Moderate to severe chronic neck or low back pain, predominantly axial, that causes functional deficit measured on pain or disability scale.  - 2. Pain present for minimum of 3 months with documented failure to respond to noninvasive conservative management (as tolerated)  - 3. Absence of untreated radiculopathy or neurogenic claudication (except for radiculopathy caused by facet joint synovial cyst)  - 4. There is no non-facet pathology per clinical assessment or radiology studies that could explain the source of the patient’s pain, including but not limited to fracture, tumor, infection, or significant deformity.    The patient has also shown a consistent positive response of at least 80% relief of primary (index) pain (with the duration of relief being consistent with the agent " used).      Randall Morin reports a pain score of 5.  Given his pain assessment as noted, treatment options were discussed and the following options were decided upon as a follow-up plan to address the patient's pain:  see plan .      --- Follow-up for MBB #2

## 2024-09-03 NOTE — SIGNIFICANT NOTE
Patient educated on the following :    - If you are receiving Sedation for your procedure Nothing to Eat 6 hours and only clear liquids for 2 hours prior to your procedure.    -You will need to have someone drive you home after your PROCEDURE and remain with you for 24 hours after the PROCEDURE  - The date of your procedure, your are welcome to have one visitor at bedside or remain within 10-15 minutes of [facility]  -You will need to arrive at 1200 on 9/4 for your PROCEDURE  -Please contact [facility] at: [phone] with any questions and/or concerns

## 2024-09-04 ENCOUNTER — HOSPITAL ENCOUNTER (OUTPATIENT)
Dept: GENERAL RADIOLOGY | Facility: SURGERY CENTER | Age: 54
End: 2024-09-04
Payer: COMMERCIAL

## 2024-09-04 ENCOUNTER — HOSPITAL ENCOUNTER (OUTPATIENT)
Facility: SURGERY CENTER | Age: 54
Setting detail: HOSPITAL OUTPATIENT SURGERY
Discharge: HOME | End: 2024-09-04
Attending: ANESTHESIOLOGY | Admitting: ANESTHESIOLOGY
Payer: COMMERCIAL

## 2024-09-04 VITALS
WEIGHT: 184 LBS | HEART RATE: 72 BPM | SYSTOLIC BLOOD PRESSURE: 105 MMHG | RESPIRATION RATE: 16 BRPM | DIASTOLIC BLOOD PRESSURE: 65 MMHG | HEIGHT: 73 IN | OXYGEN SATURATION: 96 % | TEMPERATURE: 97.8 F | BODY MASS INDEX: 24.39 KG/M2

## 2024-09-04 DIAGNOSIS — Z41.9 SURGERY, ELECTIVE: ICD-10-CM

## 2024-09-04 PROCEDURE — 64494 INJ PARAVERT F JNT L/S 2 LEV: CPT | Performed by: ANESTHESIOLOGY

## 2024-09-04 PROCEDURE — 64493 INJ PARAVERT F JNT L/S 1 LEV: CPT | Performed by: ANESTHESIOLOGY

## 2024-09-04 PROCEDURE — 76000 FLUOROSCOPY <1 HR PHYS/QHP: CPT

## 2024-09-04 PROCEDURE — 77002 NEEDLE LOCALIZATION BY XRAY: CPT

## 2024-09-04 PROCEDURE — 25010000002 BUPIVACAINE (PF) 0.5 % SOLUTION 10 ML VIAL: Performed by: ANESTHESIOLOGY

## 2024-09-04 PROCEDURE — 25510000001 IOPAMIDOL 61 % SOLUTION 30 ML VIAL: Performed by: ANESTHESIOLOGY

## 2024-09-04 NOTE — DISCHARGE INSTRUCTIONS
[facility] - Post-procedure Instructions          --  While there are no absolute restrictions, it is recommended that you do not perform strenuous activity today. In the morning, you may resume your level of activity as before your block.    --  If you have a band-aid at your injection site, please remove it later today. Observe the area for any redness, swelling, pus-like drainage, or a temperature over 101°. If any of these symptoms occur, please call your doctor at [phone]. If after office hours, leave a message and the on-call provider will return your call.    --  Ice may be applied to your injection site. It is recommended you avoid direct heat (heating pad; hot tub) for 1-2 days.    --  Call [facility] at [phone] if you experience persistent headache, persistent bleeding from the injection site, or severe pain not relieved by heat or oral medication.    --  Do not make important decisions today.    --  Due to the effects of the block and/or the I.V. Sedation, DO NOT drive or operate hazardous machinery for 12 hours.  Local anesthetics may cause numbness after procedure and precautions must be taken with regards to operating equipment as well as with walking, even if ambulating with assistance of another person or with an assistive device.    --  Do not drink alcohol for 12 hours.    -- You may return to work tomorrow, or as directed by your referring doctor.    --  Occasionally you may notice a slight increase in your pain after the procedure. This should start to improve within the next 24-48 hours. Radiofrequency ablation procedure pain may last 3-4 weeks.    --  It may take as long as 3-4 days before you notice a gradual improvement in your pain and/or other symptoms.    -- You may continue to take your prescribed pain medication as needed.    --  Some normal possible side effects of steroid use could include fluid retention, increased blood sugar, dull headache,  increased sweating, increased appetite, mood swings and flushing.    --  Diabetics are recommended to watch their blood glucose level closely for 24-48 hours after the injection.    --  Must stay in PACU for 20 min upon arrival and prove no leg weakness before being discharged.    --  IN THE EVENT OF A LIFE THREATENING EMERGENCY, (CHEST PAIN, BREATHING DIFFICULTIES, PARALYSIS…) YOU SHOULD GO TO YOUR NEAREST EMERGENCY ROOM.    --  You should be contacted by our office within 2-3 days to schedule follow up or next appointment date.  If not contacted within 7 days, please call the office at [phone]

## 2024-09-04 NOTE — OP NOTE
Bilateral L2-4 Lumbar Medial Branch Blockade  [facility]      PREOPERATIVE DIAGNOSIS:  Lumbar spondylosis without myelopathy    POSTOPERATIVE DIAGNOSIS:  Lumbar spondylosis without myelopathy    PROCEDURE:   Diagnostic Bilateral Lumbar Medial Branch Nerve Blockades, with fluoroscopy:  L2, L3, and L4 nerves (at the L3 & L4 & L5 transverse processes) to block facet joints L3-4 and L4-5 bilaterally  86843-45 -- Bilateral Lumbar Facet blocks, 1st Level  86503-42 -- Bilateral Lumbar Facet blocks, 2nd  Level    PRE-PROCEDURE DISCUSSION WITH PATIENT:    Risks and complications were discussed with the patient prior to starting the procedure and informed consent was obtained.      SURGEON:  Edson Pearce MD    REASON FOR PROCEDURE:    Previous diagnostic positivity of a Lumbar Medial Branch Blockade at the same levels    SEDATION:  Patient declined administration of moderate sedation    ANESTHETIC:  Marcaine 0.5%  STEROID:  NONE  TOTAL VOLUME OF SOLUTION:  6 mL    DESCRIPTON OF PROCEDURE:  After obtaining informed consent, IV access was not obtained in the preoperative area.   The patient was taken to the operating room.  The patient was placed in the prone position with a pillow under the abdomen. All pressure points were well padded.  EKG, blood pressure, and pulse oximeter were monitored.  The patient was monitored and sedated by the RN under my direction. The lumbosacral area was prepped with Chloraprep and draped in a sterile fashion. Under fluoroscopic guidance the transverse processes of the L3 & L4 & L5 vertebrae at the junctions of the superior articular processes were identified on the right.  Skin and subcutaneous tissue were anesthetized with 1% lidocaine above each of these points. A spinal needle was introduced under fluoroscopic guidance at the above junctions. Aspiration was negative for blood and CSF.  After confirming the position of the needle with fluoroscope in all views, 0.25 mL  of Omnipaque was injected, and after seeing the proper spread a total of 1 mL of the anesthetic solution noted above was injected at each of these points.  Needles were removed intact from each of the areas.  A similar procedure was repeated to block the L2 & L3 and L4 nerves on the contralateral side.   Onset of analgesia was noted.  Vital signs remained stable throughout.      ESTIMATED BLOOD LOSS:  <5 mL  SPECIMENS:  none    COMPLICATIONS:   No complications were noted., There was no indication of vascular uptake on live injection of contrast dye., and The patient did not have any signs of postprocedure numbness nor weakness.    TOLERANCE & DISCHARGE CONDITION:    The patient tolerated the procedure well.  The patient was transported to the recovery area without difficulties.  The patient was discharged to home under the care of family in stable and satisfactory condition.    PLAN OF CARE:  The patient was given our standard instruction sheet.  We discussed that Lumbar Medial Branch Blockade is a diagnostic procedure in consideration for radiofrequency ablation if two diagnostic procedures prove to be positive for significant benefit.  If sustained relief of 6 to eight weeks is obtained, then an alternative plan could be therapeutic lumbar branch blockades.  The patient is asked to keep a pain log each hour for 8 hours after the procedure today.  The patient will  Return to clinic 1 wk  The patient will resume all medications as per the medication reconciliation sheet.

## 2024-09-12 ENCOUNTER — OFFICE VISIT (OUTPATIENT)
Dept: PAIN MEDICINE | Facility: CLINIC | Age: 54
End: 2024-09-12
Payer: COMMERCIAL

## 2024-09-12 VITALS
SYSTOLIC BLOOD PRESSURE: 103 MMHG | WEIGHT: 182.2 LBS | HEIGHT: 73 IN | TEMPERATURE: 97.2 F | DIASTOLIC BLOOD PRESSURE: 70 MMHG | HEART RATE: 82 BPM | OXYGEN SATURATION: 95 % | BODY MASS INDEX: 24.15 KG/M2

## 2024-09-12 DIAGNOSIS — M47.816 LUMBAR FACET ARTHROPATHY: Primary | ICD-10-CM

## 2024-09-12 DIAGNOSIS — M51.36 DDD (DEGENERATIVE DISC DISEASE), LUMBAR: ICD-10-CM

## 2024-09-12 PROCEDURE — 99214 OFFICE O/P EST MOD 30 MIN: CPT | Performed by: NURSE PRACTITIONER

## 2024-09-12 NOTE — PROGRESS NOTES
CHIEF COMPLAINT  F/U back pain    Subjective   Randall Morin is a 54 y.o. male  who presents to the office for follow-up of procedure.  He completed a bilateral L3-L5 MBB #2   on  9/4/2024 performed by Dr. Pearce for management of back pain. Patient reports 85% relief from the procedure x 2 days.     Bilateral L2-4 Lumbar Medial Branch Blockade #1  on  8/16/2024 performed by Dr. Pearce for management of back pain. Patient reports 85% relief from the procedure for a few days.     Back Pain  This is a recurrent problem. The current episode started more than 1 month ago. The problem is unchanged. The quality of the pain is described as aching. The pain does not radiate. The pain is at a severity of 4/10. The symptoms are aggravated by position, standing, bending and twisting (walking). Pertinent negatives include no abdominal pain, chest pain, dysuria, fever, headaches, numbness or weakness. He has tried NSAIDs, home exercises, heat and ice for the symptoms. The treatment provided mild relief.        PEG Assessment   What number best describes your pain on average in the past week?5  What number best describes how, during the past week, pain has interfered with your enjoyment of life?5  What number best describes how, during the past week, pain has interfered with your general activity?  5    Review of Pertinent Medical Data ---      The following portions of the patient's history were reviewed and updated as appropriate: allergies, current medications, past family history, past medical history, past social history, past surgical history, and problem list.    Review of Systems   Constitutional:  Negative for activity change, chills, fatigue and fever.   HENT:  Negative for congestion.    Eyes:  Negative for visual disturbance.   Respiratory:  Negative for chest tightness and shortness of breath.    Cardiovascular:  Negative for chest pain.   Gastrointestinal:  Negative for abdominal pain, constipation and diarrhea.  "  Genitourinary:  Negative for difficulty urinating and dysuria.   Musculoskeletal:  Positive for back pain.   Neurological:  Negative for dizziness, weakness, light-headedness, numbness and headaches.   Psychiatric/Behavioral:  Positive for sleep disturbance. Negative for agitation. The patient is not nervous/anxious.      I have reviewed and confirmed the accuracy of the ROS as documented by the MA/LPN/RN Ema MendozanisANTWON     Vitals:    09/12/24 0827   BP: 103/70   Pulse: 82   Temp: 97.2 °F (36.2 °C)   SpO2: 95%   Weight: 82.6 kg (182 lb 3.2 oz)   Height: 185.4 cm (73\")   PainSc:   4   PainLoc: Back     Objective   Physical Exam  Vitals and nursing note reviewed.   Constitutional:       General: He is not in acute distress.     Appearance: Normal appearance. He is not ill-appearing.   Pulmonary:      Effort: Pulmonary effort is normal. No respiratory distress.   Musculoskeletal:      Lumbar back: Tenderness and bony tenderness present.      Comments: +lumbar facet tenderness/loading    Neurological:      Mental Status: He is alert and oriented to person, place, and time.      Gait: Gait normal.   Psychiatric:         Mood and Affect: Mood normal.         Behavior: Behavior normal.       Assessment & Plan   Diagnoses and all orders for this visit:    1. Lumbar facet arthropathy (Primary)    2. DDD (degenerative disc disease), lumbar      --- Lumbar RFA (Bilateral L2-L4)    Thermal Radiofrequency Destruction    This repeat thermal radiofrequency destruction (RFA) of cervical, thoracic, or lumbar paravertebral facet joint (medial branch) nerves at the same anatomical site is considered medically reasonable and necessary as this patient has met the criteria of having a minimum of consistent 50% improvement in pain for at least six (6) months or at least 50% consistent improvement in the ability to perform previously painful movements and ADLs as compared to baseline measurement using the same " scale.    Discussed with the patient that sedation is optional for this procedure.  The sedation offered is called conscious sedation which is different from general anesthesia that is utilized in surgical procedures. The dosing of the sedation is determined by the physician and they will be monitored throughout the procedure. With conscious sedation it is possible to remember parts or all of the procedure, this is normal. They will need to have a  with them as driving is prohibited following conscious sedation.     NPO instructions for conscious sedation:  --- Do not eat 6 hours prior to the procedure.   --- Do not drink any dairy or citrus 4 hours prior to the procedure.   --- Do not drink anything, including clear liquids, 2 hours prior to procedure.     If the NPO instructions are not followed then the procedure may be performed without sedation or the procedure will need to be rescheduled.         Randall Morin reports a pain score of 4.  Given his pain assessment as noted, treatment options were discussed and the following options were decided upon as a follow-up plan to address the patient's pain:  lumbar RFA .      --- Follow-up for RFA

## 2024-10-08 RX ORDER — MIDAZOLAM HYDROCHLORIDE 2 MG/2ML
2 INJECTION, SOLUTION INTRAMUSCULAR; INTRAVENOUS ONCE
Status: COMPLETED | OUTPATIENT
Start: 2024-10-09 | End: 2024-10-09

## 2024-10-08 RX ORDER — FENTANYL CITRATE 50 UG/ML
50 INJECTION, SOLUTION INTRAMUSCULAR; INTRAVENOUS ONCE
Status: COMPLETED | OUTPATIENT
Start: 2024-10-09 | End: 2024-10-09

## 2024-10-09 ENCOUNTER — HOSPITAL ENCOUNTER (OUTPATIENT)
Dept: GENERAL RADIOLOGY | Facility: SURGERY CENTER | Age: 54
Setting detail: HOSPITAL OUTPATIENT SURGERY
End: 2024-10-09
Payer: COMMERCIAL

## 2024-10-09 ENCOUNTER — HOSPITAL ENCOUNTER (OUTPATIENT)
Facility: SURGERY CENTER | Age: 54
Setting detail: HOSPITAL OUTPATIENT SURGERY
Discharge: HOME | End: 2024-10-09
Attending: ANESTHESIOLOGY | Admitting: ANESTHESIOLOGY
Payer: COMMERCIAL

## 2024-10-09 VITALS
SYSTOLIC BLOOD PRESSURE: 116 MMHG | RESPIRATION RATE: 18 BRPM | WEIGHT: 185 LBS | OXYGEN SATURATION: 95 % | DIASTOLIC BLOOD PRESSURE: 74 MMHG | BODY MASS INDEX: 24.52 KG/M2 | HEIGHT: 73 IN | HEART RATE: 64 BPM | TEMPERATURE: 98.2 F

## 2024-10-09 DIAGNOSIS — Z41.9 SURGERY, ELECTIVE: ICD-10-CM

## 2024-10-09 DIAGNOSIS — M47.816 LUMBAR FACET ARTHROPATHY: ICD-10-CM

## 2024-10-09 PROCEDURE — 25010000002 FENTANYL CITRATE (PF) 50 MCG/ML SOLUTION: Performed by: ANESTHESIOLOGY

## 2024-10-09 PROCEDURE — 25010000002 MIDAZOLAM PER 1MG: Performed by: ANESTHESIOLOGY

## 2024-10-09 PROCEDURE — 64635 DESTROY LUMB/SAC FACET JNT: CPT | Performed by: ANESTHESIOLOGY

## 2024-10-09 PROCEDURE — 76000 FLUOROSCOPY <1 HR PHYS/QHP: CPT

## 2024-10-09 PROCEDURE — 25010000002 LIDOCAINE PF 2% 2 % SOLUTION 5 ML VIAL: Performed by: ANESTHESIOLOGY

## 2024-10-09 PROCEDURE — 64636 DESTROY L/S FACET JNT ADDL: CPT | Performed by: ANESTHESIOLOGY

## 2024-10-09 PROCEDURE — 25010000002 LIDOCAINE PF 1% 1 % SOLUTION 5 ML VIAL: Performed by: ANESTHESIOLOGY

## 2024-10-09 PROCEDURE — 25010000002 BUPIVACAINE (PF) 0.5 % SOLUTION 10 ML VIAL: Performed by: ANESTHESIOLOGY

## 2024-10-09 RX ORDER — SODIUM CHLORIDE 0.9 % (FLUSH) 0.9 %
10 SYRINGE (ML) INJECTION AS NEEDED
Status: DISCONTINUED | OUTPATIENT
Start: 2024-10-09 | End: 2024-10-09 | Stop reason: HOSPADM

## 2024-10-09 RX ORDER — MIDAZOLAM HYDROCHLORIDE 1 MG/ML
2 INJECTION INTRAMUSCULAR; INTRAVENOUS ONCE
Status: COMPLETED | OUTPATIENT
Start: 2024-10-09 | End: 2024-10-09

## 2024-10-09 RX ORDER — SODIUM CHLORIDE 0.9 % (FLUSH) 0.9 %
10 SYRINGE (ML) INJECTION EVERY 12 HOURS SCHEDULED
Status: DISCONTINUED | OUTPATIENT
Start: 2024-10-09 | End: 2024-10-09 | Stop reason: HOSPADM

## 2024-10-09 RX ADMIN — FENTANYL CITRATE 50 MCG: 50 INJECTION INTRAMUSCULAR; INTRAVENOUS at 14:59

## 2024-10-09 RX ADMIN — MIDAZOLAM HYDROCHLORIDE 2 MG: 2 INJECTION, SOLUTION INTRAMUSCULAR; INTRAVENOUS at 15:13

## 2024-10-09 RX ADMIN — MIDAZOLAM HYDROCHLORIDE 2 MG: 2 INJECTION, SOLUTION INTRAMUSCULAR; INTRAVENOUS at 14:59

## 2024-10-09 NOTE — OP NOTE
Bilateral L2-4 Lumbar Medial Branch RADIOFREQUENCY  [facility]      PREOPERATIVE DIAGNOSIS:  Lumbar spondylosis without myelopathy    POSTOPERATIVE DIAGNOSIS:  Lumbar spondylosis without myelopathy    PROCEDURE:   Diagnostic Bilateral Lumbar Medial Branch Nerve thermal radiofrequency lesioning, with fluoroscopy:  L2, L3, and L4 nerves (at the L3 and L4 and L5 transverse processes) to thermally treat the innervation to facet joints L3-4 and L4-5   [id number] -- Bilateral L/S facet neuro destr., 1st Level  [id number] -- Bilateral L/S facet neuro destr., 2nd  Level    PRE-PROCEDURE DISCUSSION WITH PATIENT:    Risks and complications were discussed with the patient prior to starting the procedure and informed consent was obtained.      SURGEON:  Edson Pearce MD    REASON FOR PROCEDURE:    The patient complains of pain that seems to have a significant axial component and Previous diagnostic positivity of two Lumbar Medial Branch Blockades at the same levels    SEDATION:  Versed 2mg & Fentanyl 50 mcg IV and The use of increased procedural sedation was carefully considered, and for this particular patient the need for additional procedural sedation seemed necessary in this instance to safely perform the procedure.  TIME OF PROCEDURE:   The intraoperative procedure time after administration of the sedative was (3288-9343) 23 minutes.     ANESTHETIC:  Lidocaine 2%  STEROID:  NONE      DESCRIPTON OF PROCEDURE:  After obtaining informed consent, IV access  was obtained in the preoperative area.   The patient was taken to the operating room.  The patient was placed in the prone position with a pillow under the abdomen. All pressure points were well padded.  EKG, blood pressure, and pulse oximeter were monitored.  The patient was monitored and sedated by the RN under my direction. The lumbosacral area was prepped with Chloraprep and draped in a sterile fashion.     Under fluoroscopic guidance the transverse  processes of the L3 and L4 and L5 vertebrae at the junctions of the superior articular processes were identified on the right.  Skin and subcutaneous tissue were anesthetized with 1ml of 1% lidocaine above each of these points. Then, radiofrequency probe needles were advanced in this fluoro view to the above junctions.  Aspiration was negative for blood and CSF.  After confirming the position of the needle with fluoroscope in all views, testing was initiated.  First, sensory testing was started on each needle a 1V and 50Hz and slowly decreased until painful pressure stimulation diminished at 0.5V.  Next, motor testing was confirmed to be negative at 3V and 2Hz for any radicular stimulation.  Then 1mL of the local anesthetic was instilled in each needle.  Two minutes elapsed, and during this time a lateral fluoroscopic view was confirmed again to ensure the needles had not advanced nor retracted.  Then, Radiofrequency Lesioning was initiated for 3 minutes at 82 degrees Celsius.  Needles were removed intact from each of the areas.     A similar procedure was repeated to address the L2, L3, L4 nerves on the contralateral side.   Onset of analgesia was noted.  Vital signs remained stable throughout.      ESTIMATED BLOOD LOSS:  <5 mL  SPECIMENS:  none    COMPLICATIONS:   No complications were noted., There was not any evidence of dural puncture.  , and The patient did not have any signs of postprocedure numbness nor weakness.    TOLERANCE & DISCHARGE CONDITION:    The patient tolerated the procedure well.  The patient was transported to the recovery area without difficulties.  The patient was discharged to home under the care of family in stable and satisfactory condition.    PLAN OF CARE:  The patient was given our standard instruction sheet.  The patient will  Return to clinic 6 wks.  The patient will resume all medications as per the medication reconciliation sheet.

## 2024-10-09 NOTE — DISCHARGE INSTRUCTIONS
[facility] - Post-procedure Instructions          --  While there are no absolute restrictions, it is recommended that you do not perform strenuous activity today. In the morning, you may resume your level of activity as before your block.    --  If you have a band-aid at your injection site, please remove it later today. Observe the area for any redness, swelling, pus-like drainage, or a temperature over 101°. If any of these symptoms occur, please call your doctor at [phone]. If after office hours, leave a message and the on-call provider will return your call.    --  Ice may be applied to your injection site. It is recommended you avoid direct heat (heating pad; hot tub) for 1-2 days.    --  Call [facility] at [phone] if you experience persistent headache, persistent bleeding from the injection site, or severe pain not relieved by heat or oral medication.    --  Do not make important decisions today.    --  Due to the effects of the block and/or the I.V. Sedation, DO NOT drive or operate hazardous machinery for 12 hours.  Local anesthetics may cause numbness after procedure and precautions must be taken with regards to operating equipment as well as with walking, even if ambulating with assistance of another person or with an assistive device.    --  Do not drink alcohol for 12 hours.    -- You may return to work tomorrow, or as directed by your referring doctor.    --  Occasionally you may notice a slight increase in your pain after the procedure. This should start to improve within the next 24-48 hours. Radiofrequency ablation procedure pain may last 3-4 weeks.    --  It may take as long as 3-4 days before you notice a gradual improvement in your pain and/or other symptoms.    -- You may continue to take your prescribed pain medication as needed.    --  Some normal possible side effects of steroid use could include fluid retention, increased blood sugar, dull headache,  increased sweating, increased appetite, mood swings and flushing.    --  Diabetics are recommended to watch their blood glucose level closely for 24-48 hours after the injection.    --  Must stay in PACU for 20 min upon arrival and prove no leg weakness before being discharged.    --  IN THE EVENT OF A LIFE THREATENING EMERGENCY, (CHEST PAIN, BREATHING DIFFICULTIES, PARALYSIS…) YOU SHOULD GO TO YOUR NEAREST EMERGENCY ROOM.    --  You should be contacted by our office within 2-3 days to schedule follow up or next appointment date.  If not contacted within 7 days, please call the office at [phone]

## 2024-11-13 ENCOUNTER — OFFICE VISIT (OUTPATIENT)
Dept: PAIN MEDICINE | Facility: CLINIC | Age: 54
End: 2024-11-13
Payer: COMMERCIAL

## 2024-11-13 VITALS
OXYGEN SATURATION: 97 % | BODY MASS INDEX: 24.78 KG/M2 | SYSTOLIC BLOOD PRESSURE: 108 MMHG | WEIGHT: 187 LBS | TEMPERATURE: 95.9 F | RESPIRATION RATE: 12 BRPM | HEART RATE: 73 BPM | DIASTOLIC BLOOD PRESSURE: 69 MMHG | HEIGHT: 73 IN

## 2024-11-13 DIAGNOSIS — M47.816 LUMBAR FACET ARTHROPATHY: Primary | ICD-10-CM

## 2024-11-13 PROCEDURE — 99214 OFFICE O/P EST MOD 30 MIN: CPT | Performed by: NURSE PRACTITIONER

## 2024-11-13 RX ORDER — METHYLPREDNISOLONE 4 MG/1
TABLET ORAL
Qty: 21 TABLET | Refills: 0 | Status: SHIPPED | OUTPATIENT
Start: 2024-11-13

## 2024-11-13 NOTE — PROGRESS NOTES
"CHIEF COMPLAINT  RADIOFREQUENCY ABLATION LUMBAR. bilateral L2-L4   Pt reports RFA was effective for the pain going down his legs but still has the soreness\"bruise\"   Overall nerve pain was 50% relief.    Subjective   Randall Morin is a 54 y.o. male  who presents to the office for follow-up of procedure.  He completed a lumbar RFA   on  10/09/2024 performed by Dr. Pearce for management of back pain. Patient reports 50% relief from the procedure.   He reports that the \"nerve\" pain is completely gone, there is still a sore/bruised feeling in the low back.      History of Present Illness     PEG Assessment   What number best describes your pain on average in the past week?6  What number best describes how, during the past week, pain has interfered with your enjoyment of life?5  What number best describes how, during the past week, pain has interfered with your general activity?  4    Review of Pertinent Medical Data ---    The following portions of the patient's history were reviewed and updated as appropriate: allergies, current medications, past family history, past medical history, past social history, past surgical history, and problem list.    Review of Systems   Constitutional:  Negative for activity change, fatigue and fever.   Respiratory:  Negative for cough, chest tightness and shortness of breath.    Cardiovascular:  Negative for chest pain.   Gastrointestinal:  Negative for abdominal pain, constipation and diarrhea.   Musculoskeletal:  Positive for back pain.   Neurological:  Positive for numbness (right buttock/hip). Negative for dizziness, weakness, light-headedness and headaches.   Psychiatric/Behavioral:  Negative for agitation, sleep disturbance and suicidal ideas. The patient is not nervous/anxious.      I have reviewed and confirmed the accuracy of the ROS as documented by the MA/LPN/RN ANTWON Almodovar     Vitals:    11/13/24 0751   BP: 108/69   BP Location: Left arm   Patient Position: " "Sitting   Cuff Size: Large Adult   Pulse: 73   Resp: 12   Temp: 95.9 °F (35.5 °C)   TempSrc: Temporal   SpO2: 97%   Weight: 84.8 kg (187 lb)   Height: 185.4 cm (73\")   PainSc:   3         Objective   Physical Exam  Vitals and nursing note reviewed.   Constitutional:       General: He is not in acute distress.     Appearance: Normal appearance. He is not ill-appearing.   Pulmonary:      Effort: Pulmonary effort is normal. No respiratory distress.   Musculoskeletal:      Lumbar back: Tenderness present. No bony tenderness.   Neurological:      Mental Status: He is alert and oriented to person, place, and time.      Gait: Gait normal.   Psychiatric:         Mood and Affect: Mood normal.         Behavior: Behavior normal.       Assessment & Plan   Diagnoses and all orders for this visit:    1. Lumbar facet arthropathy (Primary)    Other orders  -     methylPREDNISolone (MEDROL) 4 MG dose pack; Take as directed on package instructions.  Dispense: 21 tablet; Refill: 0          --- MDP to pharmacy for residual post procedural discomfort  --- May consider LESI if not improving  --- RFA can be repeated every 6 + months PRN    Randall Pleitezey reports a pain score of 3.  Given his pain assessment as noted, treatment options were discussed and the following options were decided upon as a follow-up plan to address the patient's pain: continuation of current treatment plan for pain.      --- Follow-up 4-6 weeks/PRN             "

## 2025-02-06 ENCOUNTER — PREP FOR SURGERY (OUTPATIENT)
Dept: SURGERY | Facility: SURGERY CENTER | Age: 55
End: 2025-02-06
Payer: COMMERCIAL

## 2025-02-06 ENCOUNTER — OFFICE VISIT (OUTPATIENT)
Dept: PAIN MEDICINE | Facility: CLINIC | Age: 55
End: 2025-02-06
Payer: COMMERCIAL

## 2025-02-06 VITALS
DIASTOLIC BLOOD PRESSURE: 68 MMHG | TEMPERATURE: 96.2 F | HEIGHT: 73 IN | HEART RATE: 81 BPM | WEIGHT: 190.8 LBS | SYSTOLIC BLOOD PRESSURE: 102 MMHG | OXYGEN SATURATION: 96 % | BODY MASS INDEX: 25.29 KG/M2

## 2025-02-06 DIAGNOSIS — M51.369 L4-L5 DISC BULGE: ICD-10-CM

## 2025-02-06 DIAGNOSIS — M54.16 LUMBAR RADICULOPATHY: ICD-10-CM

## 2025-02-06 DIAGNOSIS — M51.362 DEGENERATION OF INTERVERTEBRAL DISC OF LUMBAR REGION WITH DISCOGENIC BACK PAIN AND LOWER EXTREMITY PAIN: ICD-10-CM

## 2025-02-06 DIAGNOSIS — M51.362 DEGENERATION OF INTERVERTEBRAL DISC OF LUMBAR REGION WITH DISCOGENIC BACK PAIN AND LOWER EXTREMITY PAIN: Primary | ICD-10-CM

## 2025-02-06 DIAGNOSIS — M47.816 LUMBAR FACET ARTHROPATHY: Primary | ICD-10-CM

## 2025-02-06 PROCEDURE — 99214 OFFICE O/P EST MOD 30 MIN: CPT

## 2025-02-06 NOTE — H&P (VIEW-ONLY)
CHIEF COMPLAINT  Back pain    Subjective   Randall Morin is a 54 y.o. male  who presents for follow-up.  He has a history of chronic low back pain. He feels that his pain pattern/complaints have changed since his last office visit.     Today pain is 5/10VAS in severity. His pain complaint today is axial low back pain that radiates into right lateral/anterior thigh terminating above the knee. He states that back pain is more bothersome than leg pain. Describes this pain as nearly continuous ache. Pain is worsened by prolonged sitting/standing, walking long distances, prolonged position, and getting in/out of car. Pain improves with rest/reposition, heat/ice, and OTC Advil PRN. He has completed PT in the past with no improvement to his pain.     Not anticoagulant, not diabetic.    Procedures:  10/9/24 - Bilateral L2-L4 RFA - unable to tell if he is still getting relief    Back Pain  This is a chronic problem. The current episode started more than 1 year ago. The problem occurs constantly. The problem has been worse since onset. The pain is present in the lumbar spine. The quality of the pain is described as aching. The pain radiates to the right thigh. The pain is at a severity of 5/10. The pain is moderate. The symptoms are aggravated by standing, position and sitting. Pertinent negatives include no fever, numbness or weakness. He has tried heat, ice and NSAIDs for the symptoms. The treatment provided mild relief.     PEG Assessment   What number best describes your pain on average in the past week?8  What number best describes how, during the past week, pain has interfered with your enjoyment of life?8  What number best describes how, during the past week, pain has interfered with your general activity?  7    Review of Pertinent Medical Data ---  Reviewed office not from ANTWON Almodovar from 11/13/2024.  Patient presents for follow-up of procedure.  He completed lumbar RFA on 10/9/2024 performed by   "[facility] which is currently offering 50% ongoing relief.  He reports improvement to his \"nerve pain\" but still feels like his low back is sore/bruised.  Medrol Dosepak was prescribed for postprocedural discomfort.  She noted he may consider an LESI if his pain does not improve.          The following portions of the patient's history were reviewed and updated as appropriate: allergies, current medications, past family history, past medical history, past social history, past surgical history, and problem list.    Review of Systems   Constitutional:  Negative for chills and fever.   Respiratory:  Negative for cough and shortness of breath.    Gastrointestinal:  Negative for constipation and diarrhea.   Genitourinary:  Negative for difficulty urinating.   Musculoskeletal:  Positive for back pain.   Neurological:  Negative for dizziness, weakness, light-headedness and numbness.   Psychiatric/Behavioral:  Negative for agitation.      I have reviewed and confirmed the accuracy of the ROS as documented by the MA/LPN/RN ANTWON Washburn    Vitals:    02/06/25 1548   BP: 102/68   BP Location: Left arm   Patient Position: Sitting   Pulse: 81   Temp: 96.2 °F (35.7 °C)   TempSrc: Temporal   SpO2: 96%   Weight: 86.5 kg (190 lb 12.8 oz)   Height: 185.4 cm (73\")   PainSc:   5   PainLoc: Back     Objective   Physical Exam  Constitutional:       Appearance: Normal appearance.   HENT:      Head: Normocephalic.   Cardiovascular:      Rate and Rhythm: Normal rate and regular rhythm.   Pulmonary:      Effort: Pulmonary effort is normal.      Breath sounds: Normal breath sounds.   Musculoskeletal:      Cervical back: Normal range of motion.      Lumbar back: Tenderness and bony tenderness present. Decreased range of motion. Positive right straight leg raise test and positive left straight leg raise test.      Comments: + lumbar facet loading/tenderness  - VIRIDIANA's test bilaterally   Skin:     General: Skin is warm and dry.      " Capillary Refill: Capillary refill takes less than 2 seconds.   Neurological:      General: No focal deficit present.      Mental Status: He is alert and oriented to person, place, and time.   Psychiatric:         Mood and Affect: Mood normal.         Behavior: Behavior normal.         Thought Content: Thought content normal.         Cognition and Memory: Cognition normal.       Assessment & Plan   Diagnoses and all orders for this visit:    1. Lumbar facet arthropathy (Primary)    2. Degeneration of intervertebral disc of lumbar region with discogenic back pain and lower extremity pain    3. L4-L5 disc bulge    4. Lumbar radiculopathy      Randall Morin reports a pain score of 5.  Given his pain assessment as noted, treatment options were discussed and the following options were decided upon as a follow-up plan to address the patient's pain: continuation of current treatment plan for pain and prescription for opiod analgesics.    --- L4/L5 LESI   ---  Indications for epidural injection:  Plan is to proceed with epidural at the appropriate level.  If the patient receives significant pain reduction and improvement in function and the plan will be to repeat the epidural when the pain worsens.  If a second epidural provides at least 6 weeks of sustained improvement that includes both pain reduction and improvement in function then an epidural injection could be repeated once again at the same level.  This is a mutual decision between the clinician and the patient that includes discussions including risks and benefits in detail as well as alternative therapies.  Patient's questions were answered to their satisfaction and to their understanding.  ---   --- Secondary plan - Right L4 & L5 TF LESI  --- Follow-up for procedure      BASSAM REPORT  As the clinician, I personally reviewed the BASSAM from 2/6/25 while the patient was in the office today.    Dictated utilizing Dragon dictation.

## 2025-02-11 ENCOUNTER — TRANSCRIBE ORDERS (OUTPATIENT)
Dept: SURGERY | Facility: SURGERY CENTER | Age: 55
End: 2025-02-11
Payer: COMMERCIAL

## 2025-02-11 DIAGNOSIS — Z41.9 SURGERY, ELECTIVE: Primary | ICD-10-CM

## 2025-02-26 RX ORDER — DIAZEPAM 5 MG/1
10 TABLET ORAL ONCE AS NEEDED
Status: DISCONTINUED | OUTPATIENT
Start: 2025-02-27 | End: 2025-02-27 | Stop reason: HOSPADM

## 2025-02-27 ENCOUNTER — HOSPITAL ENCOUNTER (OUTPATIENT)
Facility: SURGERY CENTER | Age: 55
Setting detail: HOSPITAL OUTPATIENT SURGERY
Discharge: HOME | End: 2025-02-27
Attending: ANESTHESIOLOGY | Admitting: ANESTHESIOLOGY
Payer: COMMERCIAL

## 2025-02-27 ENCOUNTER — HOSPITAL ENCOUNTER (OUTPATIENT)
Dept: GENERAL RADIOLOGY | Facility: SURGERY CENTER | Age: 55
End: 2025-02-27
Payer: COMMERCIAL

## 2025-02-27 VITALS
HEART RATE: 83 BPM | TEMPERATURE: 98 F | SYSTOLIC BLOOD PRESSURE: 116 MMHG | OXYGEN SATURATION: 98 % | DIASTOLIC BLOOD PRESSURE: 67 MMHG | RESPIRATION RATE: 16 BRPM

## 2025-02-27 DIAGNOSIS — Z41.9 SURGERY, ELECTIVE: ICD-10-CM

## 2025-02-27 DIAGNOSIS — M51.362 DEGENERATION OF INTERVERTEBRAL DISC OF LUMBAR REGION WITH DISCOGENIC BACK PAIN AND LOWER EXTREMITY PAIN: ICD-10-CM

## 2025-02-27 DIAGNOSIS — M54.16 LUMBAR RADICULOPATHY: ICD-10-CM

## 2025-02-27 DIAGNOSIS — M51.369 L4-L5 DISC BULGE: ICD-10-CM

## 2025-02-27 PROCEDURE — 77002 NEEDLE LOCALIZATION BY XRAY: CPT

## 2025-02-27 PROCEDURE — 62323 NJX INTERLAMINAR LMBR/SAC: CPT | Performed by: ANESTHESIOLOGY

## 2025-02-27 PROCEDURE — 25510000001 IOPAMIDOL 61 % SOLUTION 30 ML VIAL: Performed by: ANESTHESIOLOGY

## 2025-02-27 PROCEDURE — 25010000002 BUPIVACAINE (PF) 0.5 % SOLUTION 10 ML VIAL: Performed by: ANESTHESIOLOGY

## 2025-02-27 PROCEDURE — 76000 FLUOROSCOPY <1 HR PHYS/QHP: CPT

## 2025-02-27 PROCEDURE — 25010000002 METHYLPREDNISOLONE PER 80 MG: Performed by: ANESTHESIOLOGY

## 2025-02-27 PROCEDURE — 25010000002 LIDOCAINE PF 1% 1 % SOLUTION 5 ML VIAL: Performed by: ANESTHESIOLOGY

## 2025-02-27 RX ORDER — ESCITALOPRAM OXALATE 10 MG/1
TABLET ORAL
Status: ON HOLD | COMMUNITY
Start: 2025-02-24 | End: 2025-02-27

## 2025-02-27 NOTE — DISCHARGE INSTRUCTIONS
[facility] - Post-procedure Instructions          --  While there are no absolute restrictions, it is recommended that you do not perform strenuous activity today. In the morning, you may resume your level of activity as before your block.    --  If you have a band-aid at your injection site, please remove it later today. Observe the area for any redness, swelling, pus-like drainage, or a temperature over 101°. If any of these symptoms occur, please call your doctor at [phone]. If after office hours, leave a message and the on-call provider will return your call.    --  Ice may be applied to your injection site. It is recommended you avoid direct heat (heating pad; hot tub) for 1-2 days.    --  Call [facility] at [phone] if you experience persistent headache, persistent bleeding from the injection site, or severe pain not relieved by heat or oral medication.    --  Do not make important decisions today.    --  Due to the effects of the block and/or the I.V. Sedation, DO NOT drive or operate hazardous machinery for 12 hours.  Local anesthetics may cause numbness after procedure and precautions must be taken with regards to operating equipment as well as with walking, even if ambulating with assistance of another person or with an assistive device.    --  Do not drink alcohol for 12 hours.    -- You may return to work tomorrow, or as directed by your referring doctor.    --  Occasionally you may notice a slight increase in your pain after the procedure. This should start to improve within the next 24-48 hours. Radiofrequency ablation procedure pain may last 3-4 weeks.    --  It may take as long as 3-4 days before you notice a gradual improvement in your pain and/or other symptoms.    -- You may continue to take your prescribed pain medication as needed.    --  Some normal possible side effects of steroid use could include fluid retention, increased blood sugar, dull headache,  increased sweating, increased appetite, mood swings and flushing.    --  Diabetics are recommended to watch their blood glucose level closely for 24-48 hours after the injection.    --  Must stay in PACU for 20 min upon arrival and prove no leg weakness before being discharged.    --  IN THE EVENT OF A LIFE THREATENING EMERGENCY, (CHEST PAIN, BREATHING DIFFICULTIES, PARALYSIS…) YOU SHOULD GO TO YOUR NEAREST EMERGENCY ROOM.    --  You should be contacted by our office within 2-3 days to schedule follow up or next appointment date.  If not contacted within 7 days, please call the office at [phone]

## 2025-02-27 NOTE — OP NOTE
Lumbar Epidural Steroid Injection  [facility]    PREOPERATIVE DIAGNOSIS:   Lumbar Spinal Stenosis unspecified regarding Neurogenic Claudication and Lumbar Radiculopathy  POSTOPERATIVE DIAGNOSIS:  Same as preop diagnosis    PROCEDURE:   Lumbar Epidural Steroid Injection, Therapeutic Translaminar Injection, with epidurogram, at  L4/L5 level    PRE-PROCEDURE DISCUSSION WITH PATIENT:    Risks and complications were discussed with the patient prior to starting the procedure and informed consent was obtained.  We discussed various topics including but not limited to bleeding, infection, injury, paralysis, nerve injury, dural puncture, coma, death, worsening of clinical picture, lack of pain relief, and postprocedural soreness.    SURGEON:  Edson Pearce MD    REASON FOR PROCEDURE:    Degenerative changes are noted in the area., Stenotic area is noted, and is likely contributing to this chronic &/or recurrent pain. , Radiating pattern of pain is likely consistent with degenerative changes in the area., and noted severe bilateral neuroforaminal narrowing    SEDATION:  Patient declined administration of moderate sedation    ANESTHETIC:  Marcaine 0.25%  STEROID:   Methylprednisolone (DEPO MEDROL) 80mg/ml    DESCRIPTON OF PROCEDURE:    After obtaining informed consent, I.V. was not started in the preop area.   The patient was taken to the operating room and placed in the prone position.  EKG, blood pressure, and pulse oximeter were monitored throughout, and sedation was provided as needed by the RN under my guidance. All pressure points were well padded.  The lumbar spine area was prepped with Chloraprep and draped in a sterile fashion.  Under fluoroscopic guidance, the above mentioned interlaminar space was identified. Skin and subcutaneous tissues were anesthetized with 1% lidocaine in the middle of the space. A Tuohy needle was introduced through the skin and advanced to this interlaminar space and  into the epidural space under fluoroscopic guidance and verified with loss-of-resistance technique to air.  After confirming the position of the needle with the fluoroscope with all the views, and after aspiration was confirmed negative for blood and CSF, 1.5 mL of Omnipaque was injected.  After seeing appropriate epidurogram with lateral and PA views, a total of 3 cc solution was injected, consisting of 1cc of local anesthetic as above, with normal saline and injectable steroid as above.     ESTIMATED BLOOD LOSS:  <5 mL  SPECIMENS:  None    COMPLICATIONS:     No complications were noted., There was no indication of vascular uptake on live injection of contrast dye., There was no indication of intrathecal uptake on live injection of contrast dye., There was not any evidence of dural puncture.  , and The patient did not have any signs of postprocedure numbness nor weakness.    TOLERANCE & DISCHARGE CONDITION:    The patient tolerated the procedure well.  The patient was transported to the recovery area without difficulties.  The patient was discharged to home under the care of family in stable and satisfactory condition.    PLAN OF CARE:  The patient was given our standard instruction sheet.  The patient will Return to clinic 4 wks  The patient will resume all medications as per the medication reconciliation sheet.

## 2025-03-27 ENCOUNTER — OFFICE VISIT (OUTPATIENT)
Dept: FAMILY MEDICINE CLINIC | Facility: CLINIC | Age: 55
End: 2025-03-27
Payer: COMMERCIAL

## 2025-03-27 VITALS
BODY MASS INDEX: 25.38 KG/M2 | TEMPERATURE: 97 F | RESPIRATION RATE: 14 BRPM | WEIGHT: 191.5 LBS | HEIGHT: 73 IN | OXYGEN SATURATION: 99 % | SYSTOLIC BLOOD PRESSURE: 110 MMHG | HEART RATE: 81 BPM | DIASTOLIC BLOOD PRESSURE: 78 MMHG

## 2025-03-27 DIAGNOSIS — G89.29 CHRONIC LOW BACK PAIN WITHOUT SCIATICA, UNSPECIFIED BACK PAIN LATERALITY: ICD-10-CM

## 2025-03-27 DIAGNOSIS — M54.50 CHRONIC LOW BACK PAIN WITHOUT SCIATICA, UNSPECIFIED BACK PAIN LATERALITY: ICD-10-CM

## 2025-03-27 DIAGNOSIS — Z00.00 ROUTINE GENERAL MEDICAL EXAMINATION AT A HEALTH CARE FACILITY: Primary | ICD-10-CM

## 2025-03-27 PROCEDURE — 99396 PREV VISIT EST AGE 40-64: CPT | Performed by: NURSE PRACTITIONER

## 2025-03-27 NOTE — PROGRESS NOTES
Chief Complaint  Annual Exam    Subjective        Randall Morin presents to [facility]  History of Present Illness    History of Present Illness  The patient presents for a physical exam and back pain.    He reports persistent lower back pain, which has not been alleviated by physical therapy or pain management interventions such as ablation and epidural injections. An MRI revealed the presence of arthritis and scoliosis. Despite these findings, he does not experience debilitating pain. He maintains an active lifestyle, engaging in regular exercise to aid sleep, but is uncertain if this contributes to his back pain.   He reports no current nerve pain, which previously radiated down to his toes. His daily activities are often hindered by his back pain, particularly those involving spinal movement. He continues to perform recommended stretches per PT and has modified his exercise routine to avoid exacerbating his pain. He also reports a change in posture, with one shoulder consistently positioned forward and the other backward.   He has been under the care of Dr. Pearce and the Pain Center. He has not been prescribed NSAIDs for his arthritis but has found Advil effective for headaches. He has not used any topical treatments for his back pain. He feels like injections are maybe just putting bandaid on problem and he is still having almost daily back pain, just not the nerve pain he was having. He is concerned with the possibility of taking daily NSAIDS and prefers to avoid if possible.  He has taken out exercises that might exacerbate his pain and is careful with any lifting and such. Has not done PT since injections.     He has a history of weekly Enbrel injections, which were discontinued during the COVID-19 pandemic due to lack of efficacy.     He reports no respiratory symptoms such as shortness of breath or cough, and no cardiac symptoms such as chest pain or palpitations. He reports  "no gastrointestinal symptoms such as nausea, vomiting, diarrhea, constipation, hematochezia, or changes in stool characteristics. He also reports no dysphagia, urinary symptoms, or hematuria.     He has a follow-up appointment with his urologist next month due to abnormal psa results which they are monitoring Q 6 months as they have been trending up. He reports no unexpected muscle aches or pains, headaches, dizziness, skin issues, or recent injuries. He uses sunscreen regularly and has not seen a dermatologist in a long time. He is up to date with his colonoscopy screenings and was advised to return for a repeat colonoscopy at age 58. He has not received a tetanus vaccine in over 20 years and has not received the shingles vaccine despite having had shingles twice in the past few years. He reports no symptoms of acid reflux.  AUGUSTINE-7 Score: AUGUSTINE 7 Total Score: 4  PHQ-9 Total Score: 6    FAMILY HISTORY  One of his brothers had skin cancer (not melanoma).    MEDICATIONS  Current: Advil  Past: Enbrel    IMMUNIZATIONS  He is overdue for a tetanus vaccine.       Objective   Vital Signs:  /78   Pulse 81   Temp 97 °F (36.1 °C) (Infrared)   Resp 14   Ht 185.4 cm (73\")   Wt 86.9 kg (191 lb 8 oz)   SpO2 99%   BMI 25.27 kg/m²   Estimated body mass index is 25.27 kg/m² as calculated from the following:    Height as of this encounter: 185.4 cm (73\").    Weight as of this encounter: 86.9 kg (191 lb 8 oz).       BMI is >= 25 and <30. (Overweight) The following options were offered after discussion;: Information on healthy weight added to patient's after visit summary.      Physical Exam  Vitals and nursing note reviewed.   Constitutional:       General: He is not in acute distress.     Appearance: He is well-developed. He is not ill-appearing.      Comments: Fit appearance   HENT:      Head: Normocephalic and atraumatic.      Right Ear: Tympanic membrane, ear canal and external ear normal.      Left Ear: Tympanic " membrane, ear canal and external ear normal.      Ears:      Comments: Bilateral cerumen buildup but no impaction     Mouth/Throat:      Mouth: Mucous membranes are moist.      Pharynx: Uvula midline. No posterior oropharyngeal erythema.   Eyes:      General: No scleral icterus.        Right eye: No discharge.         Left eye: No discharge.      Conjunctiva/sclera: Conjunctivae normal.      Pupils: Pupils are equal, round, and reactive to light.   Neck:      Thyroid: No thyromegaly.   Cardiovascular:      Rate and Rhythm: Normal rate and regular rhythm.      Heart sounds: Normal heart sounds.   Pulmonary:      Effort: Pulmonary effort is normal.      Breath sounds: Normal breath sounds.   Abdominal:      General: Bowel sounds are normal. There is no distension.      Palpations: Abdomen is soft. There is no mass.      Tenderness: There is no abdominal tenderness. There is no guarding or rebound.      Hernia: No hernia is present.   Musculoskeletal:      Cervical back: Neck supple.      Lumbar back: Decreased range of motion.      Comments: Posturing upper body positioning little abnl   Lymphadenopathy:      Cervical: No cervical adenopathy.   Skin:     General: Skin is warm and dry.   Neurological:      General: No focal deficit present.      Mental Status: He is alert and oriented to person, place, and time.   Psychiatric:         Mood and Affect: Mood normal.         Behavior: Behavior normal.         Thought Content: Thought content normal.          Physical Exam      Vital Signs  Blood pressure is normal.     Result Review :            Results  Imaging  MRI showed arthritis and a little bit of scoliosis.                Assessment and Plan     Diagnoses and all orders for this visit:    1. Routine general medical examination at a health care facility (Primary)  -     CBC (No Diff)  -     Comprehensive Metabolic Panel  -     Hemoglobin A1c  -     Lipid Panel With LDL / HDL Ratio  -     TSH Rfx On Abnormal To Free  T4    2. Chronic low back pain without sciatica, unspecified back pain laterality        Assessment & Plan  1.  Chronic low back pain.  The patient's back pain persists despite previous physical therapy, ablation, and epidural injections. He reports some improvement but still experiences significant discomfort. The MRI done last June shows multi-level spondylotic changes. He is advised to discuss the potential benefits of physical therapy with his pain management specialist during his follow-up visit today.  He has not done any physical therapy since starting with pain management, will defer recommendations to pain management. My concern is ongoing posturing changes secondary to chronic back pain that may exacerbate back pain in the long run.  A referral to a neurosurgeon will be considered if necessary. We also discussed NSAIDs like ibuprofen as needed for pain relief but to be cautious of potential stomach and kidney issues with long-term use to treat arthritic changes.  Reviewed last couple notes from February by pain management.    2.  Elevated PSA  Reviewed last urology note from November which did indicate that if his PSA is above 3 that biopsy will be needed.  Patient continues to follow closely every 6 months.    2. Health maintenance.  He is advised to schedule an appointment with a dermatologist for a skin check within the next year. He is also encouraged to receive the shingles vaccine, especially given his history of having shingles twice. Fasting labs will be ordered, and he is instructed to abstain from food after midnight but may consume non-sugary drinks. He is also advised to update his tetanus vaccine, as it has been at least 20 years since his last one.    Appropriate health maintenance and prevention topics specific for this patient were discussed today.  Additionally, health goals, and health concerns addressed as appropriate.  Pt was encouraged to stay up to date on recommended screenings and  vaccines based on USPSTF guidelines.               Follow Up     No follow-ups on file.  Patient was given instructions and counseling regarding his condition or for health maintenance advice. Please see specific information pulled into the AVS if appropriate.    Patient or patient representative verbalized consent for the use of Ambient Listening during the visit with  ANTWON Díaz for chart documentation. 4/14/2025  07:18 EDT

## 2025-03-28 ENCOUNTER — OFFICE VISIT (OUTPATIENT)
Dept: PAIN MEDICINE | Facility: CLINIC | Age: 55
End: 2025-03-28
Payer: COMMERCIAL

## 2025-03-28 VITALS
BODY MASS INDEX: 25.66 KG/M2 | OXYGEN SATURATION: 96 % | RESPIRATION RATE: 16 BRPM | SYSTOLIC BLOOD PRESSURE: 120 MMHG | HEIGHT: 73 IN | HEART RATE: 93 BPM | TEMPERATURE: 96.9 F | DIASTOLIC BLOOD PRESSURE: 74 MMHG | WEIGHT: 193.6 LBS

## 2025-03-28 DIAGNOSIS — M54.16 LUMBAR RADICULOPATHY: Primary | ICD-10-CM

## 2025-03-28 DIAGNOSIS — M47.816 LUMBAR FACET ARTHROPATHY: ICD-10-CM

## 2025-03-28 DIAGNOSIS — M51.369 L4-L5 DISC BULGE: ICD-10-CM

## 2025-03-28 DIAGNOSIS — M51.362 DEGENERATION OF INTERVERTEBRAL DISC OF LUMBAR REGION WITH DISCOGENIC BACK PAIN AND LOWER EXTREMITY PAIN: ICD-10-CM

## 2025-03-28 PROCEDURE — 99214 OFFICE O/P EST MOD 30 MIN: CPT

## 2025-03-28 NOTE — PROGRESS NOTES
CHIEF COMPLAINT  Back pain    Subjective   Randall Morin is a 54 y.o. male  who presents to the office for follow-up of procedure.  He completed a L4/L5 LESI on 02/27/25 performed by Dr. Pearce for management of back pain. Patient reports 50% relief from the procedure. He notes improvement to radicular symptoms since his injection but continues to report that pain varies from day to day and depends on activity level.     Today pain is 2/10VAS in severity. His pain complaint continues to be axial low back pain, right side slightly worse than left. He reports that leg pain has improved since his procedure. Describes this pain as nearly continuous ache. Pain is worsened by prolonged sitting/standing, walking long distances, prolonged position, and getting in/out of car. Pain improves with rest/reposition, heat/ice, and OTC Advil PRN. He has completed PT in the past with no improvement to his pain.      Not anticoagulant, not diabetic.     Procedures:  2/27/25 - L4/L5 LESI - 50% ongoing relief  10/9/24 - Bilateral L2-L4 RFA - 50% ongoing relief   9/4/24 - Bilateral L2-L4 MBB - 80% relief x 2 weeks  8/16/24 - Bilateral L2-L4 MBB - 85% relief for a few days     Back Pain  This is a chronic problem. The current episode started more than 1 year ago. The problem occurs constantly. The problem has been improved since onset. The pain is present in the lumbar spine. The quality of the pain is described as aching. The pain radiates to the right thigh. The pain is at a severity of 2/10. The pain is moderate. The symptoms are aggravated by standing, position and sitting. Pertinent negatives include no fever, numbness or weakness. He has tried heat, ice and NSAIDs for the symptoms. The treatment provided mild relief.      PEG Assessment   What number best describes your pain on average in the past week?6  What number best describes how, during the past week, pain has interfered with your enjoyment of life?5  What number best  "describes how, during the past week, pain has interfered with your general activity?  5    Review of Pertinent Medical Data ---  Reviewed office note from ANTWON Noel from 3/27/2025.  She notes patient continues to report low back pain that has not been alleviated with PT or interventions performed with pain management such as ablation and epidural injections.  He continues to perform home exercise program.  He is concerned about taking daily NSAIDs and wishes to avoid if possible.  He has not done physical therapy since his injections.  He is scheduled to follow-up with his urologist next month due to an abnormal PSA.  He is up-to-date with his colonoscopy.  She recommended he discuss physical therapy with his pain management provider.  She is worried about posture changes secondary to chronic back pain and mentioned a referral to neurosurgery may be considered in the future.              The following portions of the patient's history were reviewed and updated as appropriate: allergies, current medications, past family history, past medical history, past social history, past surgical history, and problem list.    Review of Systems   Constitutional:  Positive for activity change. Negative for fever.   Gastrointestinal:  Negative for constipation and diarrhea.   Genitourinary:  Negative for difficulty urinating.   Musculoskeletal:  Positive for back pain.   Neurological:  Negative for weakness and numbness.   Psychiatric/Behavioral:  Positive for sleep disturbance. Negative for self-injury and suicidal ideas.      I have reviewed and confirmed the accuracy of the ROS as documented by the MA/LPN/RN ANTWON Washburn     Vitals:    03/28/25 1457   BP: 120/74   Pulse: 93   Resp: 16   Temp: 96.9 °F (36.1 °C)   SpO2: 96%   Weight: 87.8 kg (193 lb 9.6 oz)   Height: 185.4 cm (73\")   PainSc: 2      Objective   Physical Exam  Constitutional:       Appearance: Normal appearance.   HENT:      Head: Normocephalic. "   Cardiovascular:      Rate and Rhythm: Normal rate and regular rhythm.   Pulmonary:      Effort: Pulmonary effort is normal.      Breath sounds: Normal breath sounds.   Musculoskeletal:      Cervical back: Normal range of motion.      Lumbar back: Tenderness and bony tenderness present. Decreased range of motion. Negative right straight leg raise test and negative left straight leg raise test.      Comments: + lumbar facet loading/tenderness  - VIRIDIANA's test bilaterally   Skin:     General: Skin is warm and dry.      Capillary Refill: Capillary refill takes less than 2 seconds.   Neurological:      General: No focal deficit present.      Mental Status: He is alert and oriented to person, place, and time.   Psychiatric:         Mood and Affect: Mood normal.         Behavior: Behavior normal.         Thought Content: Thought content normal.         Cognition and Memory: Cognition normal.       Assessment & Plan   Diagnoses and all orders for this visit:    1. Lumbar radiculopathy (Primary)  -     Ambulatory Referral to Neurosurgery    2. L4-L5 disc bulge  -     Ambulatory Referral to Neurosurgery    3. Degeneration of intervertebral disc of lumbar region with discogenic back pain and lower extremity pain    4. Lumbar facet arthropathy      Randall Morin reports a pain score of 2.  Given his pain assessment as noted, treatment options were discussed and the following options were decided upon as a follow-up plan to address the patient's pain: continuation of current treatment plan for pain and referral to specialist for assistance in pain treatment guidance.    --- Referral to Neurosurgery per patient request. He is concerned that injections and medications are only masking a problem and not treating it.   --- Offered referral to PT but patient declined at this time.  --- May consider Meloxicam 15mg daily pending lab work. He plans to complete this on Monday 3/31/25. Discussed that if started on this medication, he will  not be able to take Advil. Patient verbalized understanding. Discussed medication with the patient.  Included in this discussion was the potential for side effects and adverse events.  Patient verbalized understanding and wished to proceed.  Prescription will be sent to pharmacy.  --- He is not interested in medications such as Gabapentin or muscle relaxants at this time  --- Repeat L4/L5 LESI every 90 days PRN  --- Follow-up as needed for worsening pain and/or to repeat injections        BASSAM REPORT  As the clinician, I personally reviewed the BASSAM from 3/28/25 while the patient was in the office today.    Dictated utilizing Dragon dictation.

## 2025-03-31 LAB
ALBUMIN SERPL-MCNC: 4.2 G/DL (ref 3.5–5.2)
ALBUMIN/GLOB SERPL: 1.2 G/DL
ALP SERPL-CCNC: 71 U/L (ref 39–117)
ALT SERPL-CCNC: 22 U/L (ref 1–41)
AST SERPL-CCNC: 27 U/L (ref 1–40)
BILIRUB SERPL-MCNC: 0.5 MG/DL (ref 0–1.2)
BUN SERPL-MCNC: 12 MG/DL (ref 6–20)
BUN/CREAT SERPL: 10.6 (ref 7–25)
CALCIUM SERPL-MCNC: 9.8 MG/DL (ref 8.6–10.5)
CHLORIDE SERPL-SCNC: 103 MMOL/L (ref 98–107)
CHOLEST SERPL-MCNC: 190 MG/DL (ref 0–200)
CO2 SERPL-SCNC: 28.5 MMOL/L (ref 22–29)
CREAT SERPL-MCNC: 1.13 MG/DL (ref 0.76–1.27)
EGFRCR SERPLBLD CKD-EPI 2021: 76.3 ML/MIN/1.73
ERYTHROCYTE [DISTWIDTH] IN BLOOD BY AUTOMATED COUNT: 12.4 % (ref 12.3–15.4)
GLOBULIN SER CALC-MCNC: 3.4 GM/DL
GLUCOSE SERPL-MCNC: 96 MG/DL (ref 65–99)
HBA1C MFR BLD: 5.4 % (ref 4.8–5.6)
HCT VFR BLD AUTO: 44.6 % (ref 37.5–51)
HDLC SERPL-MCNC: 51 MG/DL (ref 40–60)
HGB BLD-MCNC: 14.6 G/DL (ref 13–17.7)
LDLC SERPL CALC-MCNC: 116 MG/DL (ref 0–100)
LDLC/HDLC SERPL: 2.21 {RATIO}
MCH RBC QN AUTO: 30.5 PG (ref 26.6–33)
MCHC RBC AUTO-ENTMCNC: 32.7 G/DL (ref 31.5–35.7)
MCV RBC AUTO: 93.3 FL (ref 79–97)
PLATELET # BLD AUTO: 281 10*3/MM3 (ref 140–450)
POTASSIUM SERPL-SCNC: 4.7 MMOL/L (ref 3.5–5.2)
PROT SERPL-MCNC: 7.6 G/DL (ref 6–8.5)
RBC # BLD AUTO: 4.78 10*6/MM3 (ref 4.14–5.8)
SODIUM SERPL-SCNC: 142 MMOL/L (ref 136–145)
TRIGL SERPL-MCNC: 131 MG/DL (ref 0–150)
TSH SERPL DL<=0.005 MIU/L-ACNC: 1.9 UIU/ML (ref 0.27–4.2)
VLDLC SERPL CALC-MCNC: 23 MG/DL (ref 5–40)
WBC # BLD AUTO: 7.08 10*3/MM3 (ref 3.4–10.8)

## 2025-05-27 ENCOUNTER — TELEPHONE (OUTPATIENT)
Dept: NEUROSURGERY | Facility: CLINIC | Age: 55
End: 2025-05-27

## 2025-05-27 NOTE — TELEPHONE ENCOUNTER
[facility] MESSAGE    Name  Randall Morin    Date of Birth  1970  Callback Phone  [phone]        Received: 05/26/2025 09:00 AM  Yeah, my name's Randall Morin. I'm at [phone]. I have an appointment tomorrow with Dr. Grijalva and I need to cancel it. I can't seem to get through on this system and she's not in it. So if somebody can either call me back to confirm or let me know, that'd be great, thanks.

## 2025-05-27 NOTE — TELEPHONE ENCOUNTER
Left voicemail. Acknowledged that he had to cancel his appt with Taylor Perkins and asked if he wanted to reschedule. Informed him we had a appt open on 05/28/2025. Asked to call back at [phone].

## (undated) DEVICE — NDL EPID TUOHY W/WINGS 20G 4.5IN

## (undated) DEVICE — EPIDURAL TRAY: Brand: MEDLINE INDUSTRIES, INC.

## (undated) DEVICE — GLV SURG TRIUMPH PF LTX 7.5 STRL

## (undated) DEVICE — TOWEL,OR,DSP,ST,BLUE,STD,4/PK,20PK/CS: Brand: MEDLINE

## (undated) DEVICE — PAD GRND E/S NT200IX RF/GEN W/CABL DISP

## (undated) DEVICE — NDL SPINE 22G 31/2IN BLK

## (undated) DEVICE — Device

## (undated) DEVICE — Device: Brand: PORTEX